# Patient Record
Sex: MALE | Race: OTHER | HISPANIC OR LATINO | Employment: FULL TIME | URBAN - METROPOLITAN AREA
[De-identification: names, ages, dates, MRNs, and addresses within clinical notes are randomized per-mention and may not be internally consistent; named-entity substitution may affect disease eponyms.]

---

## 2018-04-03 ENCOUNTER — TRANSCRIBE ORDERS (OUTPATIENT)
Dept: ADMINISTRATIVE | Facility: HOSPITAL | Age: 73
End: 2018-04-03

## 2018-04-03 DIAGNOSIS — R52 PAIN: Primary | ICD-10-CM

## 2018-04-09 ENCOUNTER — HOSPITAL ENCOUNTER (OUTPATIENT)
Dept: RADIOLOGY | Facility: HOSPITAL | Age: 73
End: 2018-04-09
Payer: MEDICARE

## 2018-04-09 ENCOUNTER — HOSPITAL ENCOUNTER (OUTPATIENT)
Dept: RADIOLOGY | Facility: HOSPITAL | Age: 73
Discharge: HOME | End: 2018-04-09
Payer: MEDICARE

## 2018-04-09 DIAGNOSIS — R52 PAIN: ICD-10-CM

## 2018-04-09 PROCEDURE — 72156 MRI NECK SPINE W/O & W/DYE: CPT

## 2018-04-09 PROCEDURE — 72158 MRI LUMBAR SPINE W/O & W/DYE: CPT

## 2018-04-09 PROCEDURE — A9585 GADOBUTROL INJECTION: HCPCS | Performed by: DENTIST

## 2018-04-09 RX ADMIN — GADOBUTROL 10 ML: 604.72 INJECTION INTRAVENOUS at 16:06

## 2018-06-22 ENCOUNTER — HOSPITAL ENCOUNTER (EMERGENCY)
Facility: HOSPITAL | Age: 73
Discharge: LEFT BEFORE DISCHARGE | End: 2018-06-22
Attending: EMERGENCY MEDICINE
Payer: MEDICARE

## 2018-06-22 ENCOUNTER — APPOINTMENT (EMERGENCY)
Dept: CT IMAGING | Facility: HOSPITAL | Age: 73
End: 2018-06-22
Payer: MEDICARE

## 2018-06-22 VITALS
DIASTOLIC BLOOD PRESSURE: 58 MMHG | HEART RATE: 57 BPM | RESPIRATION RATE: 20 BRPM | HEIGHT: 72 IN | BODY MASS INDEX: 25.56 KG/M2 | WEIGHT: 188.71 LBS | SYSTOLIC BLOOD PRESSURE: 171 MMHG | OXYGEN SATURATION: 98 % | TEMPERATURE: 97.9 F

## 2018-06-22 DIAGNOSIS — R29.898 TRANSIENT LEFT LEG WEAKNESS: ICD-10-CM

## 2018-06-22 DIAGNOSIS — G45.9 TIA (TRANSIENT ISCHEMIC ATTACK): Primary | ICD-10-CM

## 2018-06-22 LAB
ALBUMIN SERPL BCP-MCNC: 3.6 G/DL (ref 3.5–5)
ALP SERPL-CCNC: 67 U/L (ref 46–116)
ALT SERPL W P-5'-P-CCNC: 20 U/L (ref 12–78)
ANION GAP SERPL CALCULATED.3IONS-SCNC: 6 MMOL/L (ref 4–13)
AST SERPL W P-5'-P-CCNC: 23 U/L (ref 5–45)
ATRIAL RATE: 56 BPM
BASOPHILS # BLD AUTO: 0.03 THOUSANDS/ΜL (ref 0–0.1)
BASOPHILS NFR BLD AUTO: 1 % (ref 0–1)
BILIRUB DIRECT SERPL-MCNC: 0.14 MG/DL (ref 0–0.2)
BILIRUB SERPL-MCNC: 0.5 MG/DL (ref 0.2–1)
BUN SERPL-MCNC: 22 MG/DL (ref 5–25)
CALCIUM SERPL-MCNC: 8.7 MG/DL (ref 8.3–10.1)
CHLORIDE SERPL-SCNC: 108 MMOL/L (ref 100–108)
CO2 SERPL-SCNC: 28 MMOL/L (ref 21–32)
CREAT SERPL-MCNC: 0.92 MG/DL (ref 0.6–1.3)
EOSINOPHIL # BLD AUTO: 0.14 THOUSAND/ΜL (ref 0–0.61)
EOSINOPHIL NFR BLD AUTO: 3 % (ref 0–6)
ERYTHROCYTE [DISTWIDTH] IN BLOOD BY AUTOMATED COUNT: 13.1 % (ref 11.6–15.1)
GFR SERPL CREATININE-BSD FRML MDRD: 83 ML/MIN/1.73SQ M
GLUCOSE SERPL-MCNC: 97 MG/DL (ref 65–140)
HCT VFR BLD AUTO: 41.3 % (ref 36.5–49.3)
HGB BLD-MCNC: 13.9 G/DL (ref 12–17)
IMM GRANULOCYTES # BLD AUTO: 0.01 THOUSAND/UL (ref 0–0.2)
IMM GRANULOCYTES NFR BLD AUTO: 0 % (ref 0–2)
LYMPHOCYTES # BLD AUTO: 1.55 THOUSANDS/ΜL (ref 0.6–4.47)
LYMPHOCYTES NFR BLD AUTO: 32 % (ref 14–44)
MCH RBC QN AUTO: 32.2 PG (ref 26.8–34.3)
MCHC RBC AUTO-ENTMCNC: 33.7 G/DL (ref 31.4–37.4)
MCV RBC AUTO: 96 FL (ref 82–98)
MONOCYTES # BLD AUTO: 0.45 THOUSAND/ΜL (ref 0.17–1.22)
MONOCYTES NFR BLD AUTO: 9 % (ref 4–12)
NEUTROPHILS # BLD AUTO: 2.72 THOUSANDS/ΜL (ref 1.85–7.62)
NEUTS SEG NFR BLD AUTO: 55 % (ref 43–75)
NRBC BLD AUTO-RTO: 0 /100 WBCS
P AXIS: 8 DEGREES
PLATELET # BLD AUTO: 162 THOUSANDS/UL (ref 149–390)
PMV BLD AUTO: 10.5 FL (ref 8.9–12.7)
POTASSIUM SERPL-SCNC: 3.6 MMOL/L (ref 3.5–5.3)
PR INTERVAL: 222 MS
PROT SERPL-MCNC: 6.9 G/DL (ref 6.4–8.2)
QRS AXIS: -20 DEGREES
QRSD INTERVAL: 124 MS
QT INTERVAL: 446 MS
QTC INTERVAL: 430 MS
RBC # BLD AUTO: 4.32 MILLION/UL (ref 3.88–5.62)
SODIUM SERPL-SCNC: 142 MMOL/L (ref 136–145)
T WAVE AXIS: 24 DEGREES
TROPONIN I SERPL-MCNC: <0.02 NG/ML
TSH SERPL DL<=0.05 MIU/L-ACNC: 2.15 UIU/ML (ref 0.36–3.74)
VENTRICULAR RATE: 56 BPM
WBC # BLD AUTO: 4.9 THOUSAND/UL (ref 4.31–10.16)

## 2018-06-22 PROCEDURE — 84484 ASSAY OF TROPONIN QUANT: CPT | Performed by: EMERGENCY MEDICINE

## 2018-06-22 PROCEDURE — 99285 EMERGENCY DEPT VISIT HI MDM: CPT

## 2018-06-22 PROCEDURE — 70496 CT ANGIOGRAPHY HEAD: CPT

## 2018-06-22 PROCEDURE — 80076 HEPATIC FUNCTION PANEL: CPT | Performed by: EMERGENCY MEDICINE

## 2018-06-22 PROCEDURE — 93005 ELECTROCARDIOGRAM TRACING: CPT

## 2018-06-22 PROCEDURE — 93010 ELECTROCARDIOGRAM REPORT: CPT | Performed by: INTERNAL MEDICINE

## 2018-06-22 PROCEDURE — 85025 COMPLETE CBC W/AUTO DIFF WBC: CPT | Performed by: EMERGENCY MEDICINE

## 2018-06-22 PROCEDURE — 80048 BASIC METABOLIC PNL TOTAL CA: CPT | Performed by: EMERGENCY MEDICINE

## 2018-06-22 PROCEDURE — 70498 CT ANGIOGRAPHY NECK: CPT

## 2018-06-22 PROCEDURE — 84443 ASSAY THYROID STIM HORMONE: CPT | Performed by: EMERGENCY MEDICINE

## 2018-06-22 PROCEDURE — 36415 COLL VENOUS BLD VENIPUNCTURE: CPT | Performed by: EMERGENCY MEDICINE

## 2018-06-22 RX ORDER — ASPIRIN 325 MG
325 TABLET, DELAYED RELEASE (ENTERIC COATED) ORAL DAILY
Qty: 30 TABLET | Refills: 0 | Status: SHIPPED | OUTPATIENT
Start: 2018-06-22

## 2018-06-22 RX ADMIN — IOHEXOL 85 ML: 350 INJECTION, SOLUTION INTRAVENOUS at 13:23

## 2018-06-22 NOTE — ED NOTES
Pt calling [employer] for ride home  RTER instructions discussed  Ambulatory off unit without assistance        Madeline Antoine, CHELSEA  06/22/18 1593

## 2018-06-22 NOTE — ED PROVIDER NOTES
History  Chief Complaint   Patient presents with    Syncope     Pt presents from Urgent Care and had a near syncopal episode  States worked all night and has not been drinking fluids  Denies complaints at this time  HPI    None       History reviewed  No pertinent past medical history  Past Surgical History:   Procedure Laterality Date    HERNIA REPAIR         History reviewed  No pertinent family history  I have reviewed and agree with the history as documented  Social History   Substance Use Topics    Smoking status: Former Smoker    Smokeless tobacco: Not on file    Alcohol use No        Review of Systems    Physical Exam  Physical Exam   Constitutional: He is oriented to person, place, and time  He appears well-developed and well-nourished  No distress  HENT:   Head: Normocephalic and atraumatic  Eyes: Conjunctivae and EOM are normal  Pupils are equal, round, and reactive to light  Neck: Normal range of motion  No tracheal deviation present  Cardiovascular: Normal rate, regular rhythm, normal heart sounds and intact distal pulses  Pulmonary/Chest: Effort normal and breath sounds normal  No respiratory distress  Abdominal: Soft  He exhibits no distension  There is no tenderness  Neurological: He is alert and oriented to person, place, and time  He has normal strength  No cranial nerve deficit or sensory deficit  He displays a negative Romberg sign  Coordination and gait normal  GCS eye subscore is 4  GCS verbal subscore is 5  GCS motor subscore is 6  Reflex Scores:       Bicep reflexes are 1+ on the right side and 1+ on the left side  Brachioradialis reflexes are 1+ on the right side and 1+ on the left side  Patellar reflexes are 1+ on the right side and 1+ on the left side  Skin: Skin is warm and dry  Psychiatric: He has a normal mood and affect  His behavior is normal    Nursing note and vitals reviewed        Vital Signs  ED Triage Vitals   Temperature Pulse Respirations Blood Pressure SpO2   06/22/18 1154 06/22/18 1154 06/22/18 1154 06/22/18 1154 06/22/18 1154   97 9 °F (36 6 °C) 55 18 (!) 172/89 99 %      Temp Source Heart Rate Source Patient Position - Orthostatic VS BP Location FiO2 (%)   06/22/18 1154 06/22/18 1154 06/22/18 1154 06/22/18 1154 --   Oral Monitor Sitting Right arm       Pain Score       06/22/18 1430       No Pain           Vitals:    06/22/18 1236 06/22/18 1237 06/22/18 1238 06/22/18 1430   BP: (!) 172/89 166/86 165/84 (!) 171/58   Pulse: 56 (!) 53 55 57   Patient Position - Orthostatic VS: Lying - Orthostatic VS Sitting - Orthostatic VS Standing - Orthostatic VS        Visual Acuity  Visual Acuity      Most Recent Value   L Pupil Size (mm)  3   R Pupil Size (mm)  3          ED Medications  Medications   iohexol (OMNIPAQUE) 350 MG/ML injection (MULTI-DOSE) 85 mL (85 mL Intravenous Given 6/22/18 1323)       Diagnostic Studies  Results Reviewed     Procedure Component Value Units Date/Time    Hepatic function panel [[id number]]  (Normal) Collected:  06/22/18 1232    Lab Status:  Final result Specimen:  Blood from Arm, Right Updated:  06/22/18 1316     Total Bilirubin 0 50 mg/dL      Bilirubin, Direct 0 14 mg/dL      Alkaline Phosphatase 67 U/L      AST 23 U/L      ALT 20 U/L      Total Protein 6 9 g/dL      Albumin 3 6 g/dL     TSH [[id number]]  (Normal) Collected:  06/22/18 1232    Lab Status:  Final result Specimen:  Blood from Arm, Right Updated:  06/22/18 1316     TSH 3RD GENERATON 2 146 uIU/mL     Narrative:         Patients undergoing fluorescein dye angiography may retain small amounts of fluorescein in the body for 48-72 hours post procedure  Samples containing fluorescein can produce falsely depressed TSH values  If the patient had this procedure,a specimen should be resubmitted post fluorescein clearance      Troponin I [[id number]]  (Normal) Collected:  06/22/18 1232    Lab Status:  Final result Specimen:  Blood from Arm, Right Updated:  06/22/18 1308     Troponin I <0 02 ng/mL     Basic metabolic panel [[id number]] Collected:  06/22/18 1232    Lab Status:  Final result Specimen:  Blood from Arm, Right Updated:  06/22/18 1303     Sodium 142 mmol/L      Potassium 3 6 mmol/L      Chloride 108 mmol/L      CO2 28 mmol/L      Anion Gap 6 mmol/L      BUN 22 mg/dL      Creatinine 0 92 mg/dL      Glucose 97 mg/dL      Calcium 8 7 mg/dL      eGFR 83 ml/min/1 73sq m     Narrative:         National Kidney Disease Education Program recommendations are as follows:  GFR calculation is accurate only with a steady state creatinine  Chronic Kidney disease less than 60 ml/min/1 73 sq  meters  Kidney failure less than 15 ml/min/1 73 sq  meters  CBC and differential [[id number]] Collected:  06/22/18 1232    Lab Status:  Final result Specimen:  Blood from Arm, Right Updated:  06/22/18 1246     WBC 4 90 Thousand/uL      RBC 4 32 Million/uL      Hemoglobin 13 9 g/dL      Hematocrit 41 3 %      MCV 96 fL      MCH 32 2 pg      MCHC 33 7 g/dL      RDW 13 1 %      MPV 10 5 fL      Platelets 899 Thousands/uL      nRBC 0 /100 WBCs      Neutrophils Relative 55 %      Immat GRANS % 0 %      Lymphocytes Relative 32 %      Monocytes Relative 9 %      Eosinophils Relative 3 %      Basophils Relative 1 %      Neutrophils Absolute 2 72 Thousands/µL      Immature Grans Absolute 0 01 Thousand/uL      Lymphocytes Absolute 1 55 Thousands/µL      Monocytes Absolute 0 45 Thousand/µL      Eosinophils Absolute 0 14 Thousand/µL      Basophils Absolute 0 03 Thousands/µL                  CTA head and neck with and without contrast   Final Result by Jerad Steinberg MD (06/22 7443)      No acute intracranial abnormality  No hemodynamically significant stenosis within either common or internal carotid artery  Less than 50% stenosis by NASCET criteria  No focal stenosis or saccular aneurysm within the [place]                 Workstation performed: [id number] Procedures  ECG 12 Lead Documentation  Date/Time: 6/22/2018 12:27 PM  Performed by: Toya De La Fuente  Authorized by: Toya De La Fuente     Indications / Diagnosis:  TIA  ECG reviewed by me, the ED Provider: yes    Patient location:  ED  Previous ECG:     Previous ECG:  Unavailable  Interpretation:     Interpretation: abnormal    Rate:     ECG rate:  56    ECG rate assessment: normal    Rhythm:     Rhythm: sinus bradycardia and A-V block    Ectopy:     Ectopy: none    QRS:     QRS axis:  Left    QRS intervals: Wide  Conduction:     Conduction: abnormal      Abnormal conduction: 1st degree    ST segments:     ST segments:  Normal  T waves:     T waves: non-specific and inverted      Inverted:  III  Other findings:     Other findings: LVH             Phone Contacts  ED Phone Contact    ED Course                   Stroke Assessment     Row Name 06/22/18 1228             NIH Stroke Scale    Interval Baseline      Level of Consciousness (1a ) 0      LOC Questions (1b ) 0      LOC Commands (1c ) 0      Best Gaze (2 ) 0      Visual (3 ) 0      Facial Palsy (4 ) 0      Motor Arm, Left (5a ) 0      Motor Arm, Right (5b ) 0      Motor Leg, Left (6a ) 0      Motor Leg, Right (6b ) 0      Limb Ataxia (7 ) 0      Sensory (8 ) 0      Best Language (9 ) 0      Dysarthria (10 ) 0      Extinction and Inattention (11 ) (Formerly Neglect) 0      Total 0                First Filed Value   TPA Decision  Patient not a TPA candidate  Patient is not a candidate options  Symptoms resolved/clearly non disabling  MDM  Number of Diagnoses or Management Options  TIA (transient ischemic attack): new and requires workup  Transient left leg weakness: new and requires workup  Diagnosis management comments: This is a 72-year-old male who presents here today with left leg weakness  He states he worked all night, and went to physical therapy for his neck    He states they were doing exercises with his legs, and about 0900 he went to stand up when he "lost his balance" which he describes as feeling like his left leg was not working correctly  He denies any associated lightheadedness, vertiginous symptoms, syncope or near syncope  He states he sat down this resolved after a couple of minutes  He stopped therapy early and drove home  He walked into the house, and as he was going through the door felt like he "lost his balance" again  He states it was just his left leg that was giving him issues, and again felt like it was not working appropriately  This resolved spontaneously after several minutes  He then took his wife to urgent care, so that she could be seen for URI symptoms  While he was walking in with her he again felt like he lost his balance, with both his left arm and leg seeming like they were not working appropriately  He denies any numbness  He denies any problems with his lower back or his leg before  He denies any vertiginous symptoms, syncope, near-syncope, chest pain, trouble breathing, palpitations, recent the injuries or infectious symptoms  He states he feels just fine at this time, and is only here because they insisted that he be seen, even though he did not think it was necessary  He denies any vision changes, tinnitus, difficulty speaking or swallowing  He denies any facial droop  He denies any known underlying medical problems  He attributes his symptoms to dehydration from working all night and not drinking  All of his episodes lasted for couple of minutes, and resolved spontaneously  He denies any prior similar symptoms  ROS: Otherwise negative, unless stated as above  He is well-appearing, in no acute distress  His exam is unremarkable, currently without any focal neurologic deficits  Concern is greatest for progressive TIAs, given as the first 2 times was just his leg, and the third involved his arm    This could be triggered by mild dehydration in low flow state when he is first standing up with partial occlusion of a vessel, however it is concerning that the symptoms are worsening with time  We will check CT/CTA per stroke protocol, as well as lab work and EKG to evaluate  We will check orthostatics as his symptoms were triggered by rapid standing  As his NIH stroke scale is currently 0, he is not a candidate for tPA at this time  The patient has had no recurrence of symptoms while in the emergency department, and was able to walk with a steady gait to the bathroom  His lab work and CT scan are unremarkable  The patient was advised that given the concern for TIAs, he should be admitted for further workup and evaluation of his symptoms  However the patient declines this  I discussed with him the concern for progressive TIAs, possibility of this preceding ischemic CVA, and risk of permanent neurologic deficit  The patient declines admission to the hospital, despite these risks  He is able to state that he understands the risk of permanent disability and weakness from missing a stroke  I discussed with him treatment at home, need for follow-up with his primary care doctor and a neurologist, and indications for return, and the patient and his wife expressed understanding with this plan           Amount and/or Complexity of Data Reviewed  Clinical lab tests: reviewed and ordered  Tests in the radiology section of CPT®: ordered and reviewed  Independent visualization of images, tracings, or specimens: yes      CritCare Time    Disposition  Final diagnoses:   TIA (transient ischemic attack)   Transient left leg weakness     Time reflects when diagnosis was documented in both MDM as applicable and the Disposition within this note     Time User Action Codes Description Comment    6/22/2018  2:26 PM [facility] Add [G45 9] TIA (transient ischemic attack)     6/22/2018  2:27 PM [facility] Add [R29 898] Transient left leg weakness       ED Disposition     ED Disposition Condition Comment    AMA  Date: 6/22/2018  Patient: Albert Pena  Admitted: 6/22/2018 11:51 AM  Attending Provider: Matilda Pond    Albert Pena or his authorized caregiver has made the decision for the patient to leave the emergency department against the advice o f his attending physician  He or his authorized caregiver has been informed and understands the inherent risks, including death, stroke, permanent weakness  He or his authorized caregiver has decided to accept the responsibility for this decision  Albert Pena and all necessary parties have been advised that he may return for further evaluation or treatment  His condition at time of discharge was stable  Albert Pena had current vital signs as follows:  /84   Pulse 55   Temp 97 9 °F (36 6 °C) (O ral)   Resp 18   Ht 6' (1 829 m)   Wt 85 6 kg (188 lb 11 4 oz)         Follow-up Information     Follow up With Specialties Details Why Contact Info Additional [address] Internal Medicine Schedule an appointment as soon as possible for a visit in 3 days to follow up on your symptoms [address] [facility]       Jamal Kent MD Neurology Schedule an appointment as soon as possible for a visit to follow up on your symptoms 3 [address] Emergency Department Emergency Medicine Go to immediately, for recurrent or worsening symptoms Kahlil Vanegas  [facility] [address], [address], [place], [address]          Discharge Medication List as of 6/22/2018  2:29 PM      START taking these medications    Details   aspirin (ECOTRIN) 325 mg EC tablet Take 1 tablet (325 mg total) by mouth daily, Starting Fri 6/22/2018, Print           No discharge procedures on file      ED Provider  Electronically Signed by           Marissa Funez MD  06/23/18 7517

## 2018-06-22 NOTE — DISCHARGE INSTRUCTIONS
Start taking the aspirin daily  Follow up closely with her primary care doctor and a neurologist for further evaluation of your symptoms  Return for recurrent symptoms, particularly if they do not resolve after a couple of minutes, or for any other concerns  Call 911 to take you to the hospital, and do not drive yourself  Transient Ischemic Attack, Ambulatory Care   GENERAL INFORMATION:   A transient ischemic attack  (TIA) happens when blood cannot flow to part of your brain  A TIA lasts a short time, and the effects are gone in less than 24 hours  A TIA does not cause lasting damage, but it may be a warning that you are about to have an ischemic stroke  An ischemic stroke happens when blood flow to the brain is suddenly blocked, usually by a blood clot  Common symptoms include the following:   · Numbness, tingling, weakness, or paralysis     · Trouble walking, swallowing, talking, or understanding language     · Blurry or double vision  How can I tell if someone is having a stroke? Know the F A S T  test to recognize the signs of a stroke:  · F = Face:  Ask the person to smile  Drooping on 1 side of the mouth or face is a sign of a stroke  · A = Arms:  Ask the person to raise both arms  One arm that slowly comes back down or cannot be raised is a sign of a stroke  · S = Speech:  Ask the person to repeat a simple sentence that you say first  Speech that is slurred or sounds strange is a sign of a stroke  · T = Time:  Call 911 if you see any of these signs  This is an emergency  Seek immediate care for the following symptoms:   · Double vision or vision loss    · Confusion and problems speaking or understanding speech    · Weakness or numbness in your arm, leg, or face    · Severe headache or feeling dizzy    · Bleeding from your rectum or nose  Treatment for a TIA  may include any of the following:  · Antiplatelets , such as aspirin, help prevent blood clots   Take your antiplatelet medicine exactly as directed  These medicines make it more likely for you to bleed or bruise  If you are told to take aspirin, do not take acetaminophen or ibuprofen instead  · Anticoagulants    are a type of blood thinner medicine that helps prevent clots  Clots can cause strokes, heart attacks, and death  These medicines may cause you to bleed or bruise more easily  ¨ Watch for bleeding from your gums or nose  Watch for blood in your urine and bowel movements  Use a soft washcloth and a soft toothbrush  If you shave, use an electric razor  Avoid activities that can cause bruising or bleeding  ¨ Tell your healthcare provider about all medicines you take because many medicines cannot be used with anticoagulants  Do not start or stop any medicines unless your healthcare provider tells you to  Tell your dentist and other healthcare providers that you take anticoagulants  Wear a bracelet or necklace that says you take this medicine  ¨ You will need regular blood tests so your healthcare provider can decide how much medicine you need  Take anticoagulants exactly as directed  Tell your healthcare provider right away if you forget to take the medicine, or if you take too much  ¨ If you take warfarin, some foods can change how your blood clots  Do not make major changes to your diet while you take warfarin  Warfarin works best when you eat about the same amount of vitamin K every day  Vitamin K is found in green leafy vegetables, broccoli, grapes, and other foods  Ask for more information about what to eat when you take warfarin  · Thrombolytics  help break apart blood clots  · Surgery  may be needed to open a blocked artery  Manage and prevent TIA:   · Manage health conditions  High blood pressure, diabetes, and high cholesterol all increase your risk of a TIA or stroke  Take your medicine as directed  Follow your healthcare provider's instructions to check your blood pressure and blood sugar levels  Write the numbers down to show him  · Eat a variety of healthy foods  Healthy foods include fruits, vegetables, whole-grain breads, low-fat dairy products, beans, lean meats, and fish  Ask if you need to be on a special diet  · Maintain a healthy weight  Ask your healthcare provider how much you should weigh  Ask him to help you create a weight loss plan if you are overweight  Weight loss can decrease your blood pressure and your risk of stroke  · Limit alcohol  Men should limit alcohol to 2 drinks per day  Women should limit alcohol to 1 drink per day  A drink of alcohol is 12 ounces of beer, 5 ounces of wine, or 1½ ounces of liquor  · Do not use street drugs or smoke cigarettes  Your risk of stroke increases if you use drugs such as cocaine, or you smoke cigarettes  Ask your healthcare provider for information if you are having trouble quitting  Follow up with your healthcare provider as directed:  Write down your questions so you remember to ask them during your visits  CARE AGREEMENT:   You have the right to help plan your care  Learn about your health condition and how it may be treated  Discuss treatment options with your caregivers to decide what care you want to receive  You always have the right to refuse treatment  The above information is an  only  It is not intended as medical advice for individual conditions or treatments  Talk to your doctor, nurse or pharmacist before following any medical regimen to see if it is safe and effective for you  © 2014 [address] is for End User's use only and may not be sold, redistributed or otherwise used for commercial purposes  All illustrations and images included in CareNotes® are the copyrighted property of [employer]  or Julien Bowden

## 2018-07-13 ENCOUNTER — TRANSCRIBE ORDERS (OUTPATIENT)
Dept: ADMINISTRATIVE | Facility: HOSPITAL | Age: 73
End: 2018-07-13

## 2018-07-13 DIAGNOSIS — I63.9 CEREBROVASCULAR ACCIDENT (CVA), UNSPECIFIED MECHANISM (HCC): Primary | ICD-10-CM

## 2018-07-24 ENCOUNTER — HOSPITAL ENCOUNTER (OUTPATIENT)
Dept: RADIOLOGY | Facility: HOSPITAL | Age: 73
Discharge: HOME | End: 2018-07-24
Payer: MEDICARE

## 2018-07-24 DIAGNOSIS — I63.9 CEREBROVASCULAR ACCIDENT (CVA), UNSPECIFIED MECHANISM (HCC): ICD-10-CM

## 2018-07-24 PROCEDURE — 70553 MRI BRAIN STEM W/O & W/DYE: CPT

## 2018-07-24 PROCEDURE — A9585 GADOBUTROL INJECTION: HCPCS | Performed by: DENTIST

## 2018-07-24 RX ADMIN — GADOBUTROL 8 ML: 604.72 INJECTION INTRAVENOUS at 16:00

## 2018-12-05 ENCOUNTER — HOSPITAL ENCOUNTER (EMERGENCY)
Facility: HOSPITAL | Age: 73
Discharge: ANOTHER HOSPITAL | End: 2018-12-05
Attending: EMERGENCY MEDICINE | Admitting: EMERGENCY MEDICINE
Payer: OTHER MISCELLANEOUS

## 2018-12-05 VITALS
DIASTOLIC BLOOD PRESSURE: 88 MMHG | OXYGEN SATURATION: 96 % | RESPIRATION RATE: 16 BRPM | BODY MASS INDEX: 25.59 KG/M2 | SYSTOLIC BLOOD PRESSURE: 176 MMHG | TEMPERATURE: 97.4 F | WEIGHT: 188.71 LBS | HEART RATE: 58 BPM

## 2018-12-05 DIAGNOSIS — T14.8XXA ABRASION: ICD-10-CM

## 2018-12-05 DIAGNOSIS — S05.32XA RUPTURE OF GLOBE OF EYE FOLLOWING BLUNT TRAUMA, LEFT, INITIAL ENCOUNTER: Primary | ICD-10-CM

## 2018-12-05 LAB
ANION GAP SERPL CALCULATED.3IONS-SCNC: 9 MMOL/L (ref 4–13)
APTT PPP: 30 SECONDS (ref 26–38)
BASOPHILS # BLD AUTO: 0.03 THOUSANDS/ΜL (ref 0–0.1)
BASOPHILS NFR BLD AUTO: 1 % (ref 0–1)
BUN SERPL-MCNC: 23 MG/DL (ref 5–25)
CALCIUM SERPL-MCNC: 9.2 MG/DL (ref 8.3–10.1)
CHLORIDE SERPL-SCNC: 107 MMOL/L (ref 100–108)
CO2 SERPL-SCNC: 28 MMOL/L (ref 21–32)
CREAT SERPL-MCNC: 0.94 MG/DL (ref 0.6–1.3)
EOSINOPHIL # BLD AUTO: 0.18 THOUSAND/ΜL (ref 0–0.61)
EOSINOPHIL NFR BLD AUTO: 3 % (ref 0–6)
ERYTHROCYTE [DISTWIDTH] IN BLOOD BY AUTOMATED COUNT: 13.2 % (ref 11.6–15.1)
GFR SERPL CREATININE-BSD FRML MDRD: 80 ML/MIN/1.73SQ M
GLUCOSE SERPL-MCNC: 101 MG/DL (ref 65–140)
HCT VFR BLD AUTO: 44.9 % (ref 36.5–49.3)
HGB BLD-MCNC: 15.3 G/DL (ref 12–17)
IMM GRANULOCYTES # BLD AUTO: 0.01 THOUSAND/UL (ref 0–0.2)
IMM GRANULOCYTES NFR BLD AUTO: 0 % (ref 0–2)
INR PPP: 1.09 (ref 0.86–1.17)
LYMPHOCYTES # BLD AUTO: 1.85 THOUSANDS/ΜL (ref 0.6–4.47)
LYMPHOCYTES NFR BLD AUTO: 31 % (ref 14–44)
MCH RBC QN AUTO: 33 PG (ref 26.8–34.3)
MCHC RBC AUTO-ENTMCNC: 34.1 G/DL (ref 31.4–37.4)
MCV RBC AUTO: 97 FL (ref 82–98)
MONOCYTES # BLD AUTO: 0.51 THOUSAND/ΜL (ref 0.17–1.22)
MONOCYTES NFR BLD AUTO: 9 % (ref 4–12)
NEUTROPHILS # BLD AUTO: 3.42 THOUSANDS/ΜL (ref 1.85–7.62)
NEUTS SEG NFR BLD AUTO: 56 % (ref 43–75)
NRBC BLD AUTO-RTO: 0 /100 WBCS
PLATELET # BLD AUTO: 220 THOUSANDS/UL (ref 149–390)
PMV BLD AUTO: 11 FL (ref 8.9–12.7)
POTASSIUM SERPL-SCNC: 3.9 MMOL/L (ref 3.5–5.3)
PROTHROMBIN TIME: 14 SECONDS (ref 11.8–14.2)
RBC # BLD AUTO: 4.64 MILLION/UL (ref 3.88–5.62)
SODIUM SERPL-SCNC: 144 MMOL/L (ref 136–145)
WBC # BLD AUTO: 6 THOUSAND/UL (ref 4.31–10.16)

## 2018-12-05 PROCEDURE — 36415 COLL VENOUS BLD VENIPUNCTURE: CPT | Performed by: PHYSICIAN ASSISTANT

## 2018-12-05 PROCEDURE — 96365 THER/PROPH/DIAG IV INF INIT: CPT

## 2018-12-05 PROCEDURE — 85025 COMPLETE CBC W/AUTO DIFF WBC: CPT | Performed by: PHYSICIAN ASSISTANT

## 2018-12-05 PROCEDURE — 90715 TDAP VACCINE 7 YRS/> IM: CPT | Performed by: PHYSICIAN ASSISTANT

## 2018-12-05 PROCEDURE — 99284 EMERGENCY DEPT VISIT MOD MDM: CPT

## 2018-12-05 PROCEDURE — 80048 BASIC METABOLIC PNL TOTAL CA: CPT | Performed by: PHYSICIAN ASSISTANT

## 2018-12-05 PROCEDURE — 96375 TX/PRO/DX INJ NEW DRUG ADDON: CPT

## 2018-12-05 PROCEDURE — 85610 PROTHROMBIN TIME: CPT | Performed by: PHYSICIAN ASSISTANT

## 2018-12-05 PROCEDURE — 85730 THROMBOPLASTIN TIME PARTIAL: CPT | Performed by: PHYSICIAN ASSISTANT

## 2018-12-05 PROCEDURE — 90471 IMMUNIZATION ADMIN: CPT

## 2018-12-05 RX ORDER — TETRACAINE HYDROCHLORIDE 5 MG/ML
1 SOLUTION OPHTHALMIC ONCE
Status: COMPLETED | OUTPATIENT
Start: 2018-12-05 | End: 2018-12-05

## 2018-12-05 RX ORDER — LEVOFLOXACIN 5 MG/ML
750 INJECTION, SOLUTION INTRAVENOUS ONCE
Status: DISCONTINUED | OUTPATIENT
Start: 2018-12-05 | End: 2018-12-05

## 2018-12-05 RX ORDER — ONDANSETRON 2 MG/ML
4 INJECTION INTRAMUSCULAR; INTRAVENOUS ONCE
Status: COMPLETED | OUTPATIENT
Start: 2018-12-05 | End: 2018-12-05

## 2018-12-05 RX ORDER — CEFAZOLIN SODIUM 2 G/50ML
2000 SOLUTION INTRAVENOUS ONCE
Status: COMPLETED | OUTPATIENT
Start: 2018-12-05 | End: 2018-12-05

## 2018-12-05 RX ADMIN — FLUORESCEIN SODIUM 1 STRIP: 1 STRIP OPHTHALMIC at 04:38

## 2018-12-05 RX ADMIN — ONDANSETRON 4 MG: 2 INJECTION INTRAMUSCULAR; INTRAVENOUS at 05:58

## 2018-12-05 RX ADMIN — TETANUS TOXOID, REDUCED DIPHTHERIA TOXOID AND ACELLULAR PERTUSSIS VACCINE, ADSORBED 0.5 ML: 5; 2.5; 8; 8; 2.5 SUSPENSION INTRAMUSCULAR at 05:01

## 2018-12-05 RX ADMIN — TETRACAINE HYDROCHLORIDE 1 DROP: 5 SOLUTION OPHTHALMIC at 04:38

## 2018-12-05 RX ADMIN — CEFAZOLIN SODIUM 2000 MG: 2 SOLUTION INTRAVENOUS at 06:02

## 2018-12-05 RX ADMIN — MORPHINE SULFATE 2 MG: 2 INJECTION, SOLUTION INTRAMUSCULAR; INTRAVENOUS at 05:57

## 2018-12-05 RX ADMIN — LEVOFLOXACIN 750 MG: 250 TABLET, FILM COATED ORAL at 07:09

## 2018-12-05 NOTE — ED NOTES
Report called to Claudette Pares at [facility] at this time  Claudette Pares requested call when patient leaves facility   Phone number [phone]     Anastasiya Welch RN  12/05/18 0113

## 2018-12-05 NOTE — ED NOTES
Please call nurse report to the following number for transport:    [phone]     Jenn Glaser  12/05/18 9561

## 2018-12-05 NOTE — EMTALA/ACUTE CARE TRANSFER
[address] [place] [address]  Dept: [phone]      AJUDZZ TRANSFER CONSENT    NAME Petra GUERRA 1945                              MRN [id number]    I have been informed of my rights regarding examination, treatment, and transfer   by Dr Tacho Adams MD    Benefits: Specialized equipment and/or services available at the receiving facility (Include comment)________________________    Risks: Potential for delay in receiving treatment      Consent for Transfer:  I acknowledge that my medical condition has been evaluated and explained to me by the emergency department physician or other qualified medical person and/or my attending physician, who has recommended that I be transferred to the service of  Accepting Physician: Dr Denis Ruiz at [facility] Name, [facility] ED [facility] : [place]  The above potential benefits of such transfer, the potential risks associated with such transfer, and the probable risks of not being transferred have been explained to me, and I fully understand them  The doctor has explained that, in my case, the benefits of transfer outweigh the risks  I agree to be transferred  I authorize the performance of emergency medical procedures and treatments upon me in both transit and upon arrival at the receiving facility  Additionally, I authorize the release of any and all medical records to the receiving facility and request they be transported with me, if possible  I understand that the safest mode of transportation during a medical emergency is an ambulance and that the Hospital advocates the use of this mode of transport  Risks of traveling to the receiving facility by car, including absence of medical control, life sustaining equipment, such as oxygen, and medical personnel has been explained to me and I fully understand them      (KATHY CORRECT BOX BELOW)  [  ]  I consent to the stated transfer and to be transported by ambulance/helicopter  [  ]  I consent to the stated transfer, but refuse transportation by ambulance and accept full responsibility for my transportation by car  I understand the risks of non-ambulance transfers and I exonerate the Hospital and its staff from any deterioration in my condition that results from this refusal     X___________________________________________    DATE  18  TIME________  Signature of patient or legally responsible individual signing on patient behalf           RELATIONSHIP TO PATIENT_________________________          Provider Certification    NAME Mckayla Lanza                                         1945                              MRN [id number]    A medical screening exam was performed on the above named patient  Based on the examination:    Condition Necessitating Transfer The primary encounter diagnosis was Rupture of globe of eye following blunt trauma, left, initial encounter  A diagnosis of Abrasion was also pertinent to this visit  Patient Condition: The patient has been stabilized such that within reasonable medical probability, no material deterioration of the patient condition or the condition of the unborn child(benson) is likely to result from the transfer    Reason for Transfer: Level of Care needed not available at this facility    Transfer Requirements: [address]   · Space available and qualified personnel available for treatment as acknowledged by    · Agreed to accept transfer and to provide appropriate medical treatment as acknowledged by       Dr Tristen Segura  · Appropriate medical records of the examination and treatment of the patient are provided at the time of transfer   [address], [address] _______  · Transfer will be performed by qualified personnel from    and appropriate transfer equipment as required, including the use of necessary and appropriate life support measures      Provider Certification: I have examined the patient and explained the following risks and benefits of being transferred/refusing transfer to the patient/family:  General risk, such as traffic hazards, adverse weather conditions, rough terrain or turbulence, possible failure of equipment (including vehicle or aircraft), or consequences of actions of persons outside the control of the transport personnel      Based on these reasonable risks and benefits to the patient and/or the unborn child(benson), and based upon the information available at the time of the patients examination, I certify that the medical benefits reasonably to be expected from the provision of appropriate medical treatments at another medical facility outweigh the increasing risks, if any, to the individuals medical condition, and in the case of labor to the unborn child, from effecting the transfer      X____________________________________________ DATE 12/05/18        TIME_______      ORIGINAL - SEND TO MEDICAL RECORDS   COPY - SEND WITH PATIENT DURING TRANSFER

## 2018-12-05 NOTE — ED PROVIDER NOTES
History  Chief Complaint   Patient presents with    Eye Laceration     states "a department sign hit left eye while at work tonight   vision is blurry" teatnus not up to date     Patient is a 79-year-old male that presents to the emergency department with complaints of left eye injury had occurred 1 hour prior to arrival   Patient states he was at work lifting a box up on a shelf when a a department store sign fell and hit him directly on his left eye  Patient states he had immediate pain and blurred vision  Patient denies loss of consciousness, headache, dizziness, weakness  Tetanus is not up-to-date  Patient has no known medical problems  Prior to Admission Medications   Prescriptions Last Dose Informant Patient Reported? Taking?   aspirin (ECOTRIN) 325 mg EC tablet   No No   Sig: Take 1 tablet (325 mg total) by mouth daily      Facility-Administered Medications: None       History reviewed  No pertinent past medical history  Past Surgical History:   Procedure Laterality Date    HERNIA REPAIR         History reviewed  No pertinent family history  I have reviewed and agree with the history as documented  Social History   Substance Use Topics    Smoking status: Former Smoker    Smokeless tobacco: Never Used    Alcohol use No        Review of Systems   Constitutional: Negative for fever  Eyes: Positive for pain and visual disturbance  Respiratory: Negative for shortness of breath  Cardiovascular: Negative for chest pain  All other systems reviewed and are negative  Physical Exam  Physical Exam   Constitutional: He is oriented to person, place, and time  He appears well-developed and well-nourished  HENT:   Head: Normocephalic  Eyes: Pupils are equal, round, and reactive to light  EOM are normal  Left conjunctiva has a hemorrhage  Slit lamp exam:       The left eye shows corneal abrasion  The left eye shows no hyphema         Vision exam with glasses  20/25 R  20/100 L Neck: Normal range of motion  Cardiovascular: Normal rate, regular rhythm, normal heart sounds and intact distal pulses  Pulmonary/Chest: Effort normal and breath sounds normal    Abdominal: Soft  Bowel sounds are normal    Musculoskeletal: Normal range of motion  Neurological: He is alert and oriented to person, place, and time  He has normal strength  No cranial nerve deficit or sensory deficit  Skin: Skin is warm  Psychiatric: He has a normal mood and affect  His behavior is normal  Judgment and thought content normal    Vitals reviewed  Vital Signs  ED Triage Vitals [12/05/18 0349]   Temperature Pulse Respirations Blood Pressure SpO2   (!) 97 4 °F (36 3 °C) 69 19 (!) 178/89 97 %      Temp Source Heart Rate Source Patient Position - Orthostatic VS BP Location FiO2 (%)   Oral Monitor Sitting Right arm --      Pain Score       7           Vitals:    12/05/18 0349   BP: (!) 178/89   Pulse: 69   Patient Position - Orthostatic VS: Sitting       Visual Acuity      ED Medications  Medications   levofloxacin (LEVAQUIN) IVPB (premix) 750 mg (not administered)   ceFAZolin (ANCEF) IVPB (premix) 2,000 mg (2,000 mg Intravenous New Bag 12/5/18 0602)   morphine injection 2 mg (not administered)   fluorescein sodium sterile ophthalmic strip 1 strip (1 strip Left Eye Given by Other 12/5/18 7828)   tetracaine 0 5 % ophthalmic solution 1 drop (1 drop Left Eye Given by Other 12/5/18 0438)   tetanus-diphtheria-acellular pertussis (BOOSTRIX) IM injection 0 5 mL (0 5 mL Intramuscular Given 12/5/18 0501)   morphine injection 2 mg (2 mg Intravenous Given 12/5/18 3657)   ondansetron (ZOFRAN) injection 4 mg (4 mg Intravenous Given 12/5/18 0558)       Diagnostic Studies  Results Reviewed     Procedure Component Value Units Date/Time    APTT [[id number]] Collected:  12/05/18 0603    Lab Status:   In process Specimen:  Blood from Arm, Right Updated:  12/05/18 Radha Allred [[id number]] Collected:  12/05/18 0603    Lab Status: In process Specimen:  Blood from Arm, Right Updated:  12/05/18 0608    CBC and differential [[id number]] Collected:  12/05/18 0603    Lab Status: In process Specimen:  Blood from Arm, Right Updated:  12/05/18 2840    Basic metabolic panel [[id number]] Collected:  12/05/18 0603    Lab Status: In process Specimen:  Blood from Arm, Right Updated:  12/05/18 0608    Rapid drug screen, urine [[id number]]     Lab Status:  No result Specimen:  Urine                  No orders to display              Procedures  Procedures       Phone Contacts  ED Phone Contact    ED Course                               MDM  Number of Diagnoses or Management Options  Abrasion:   Rupture of globe of eye following blunt trauma, left, initial encounter:   Diagnosis management comments: Patient is a 79-year-old male presents to the emergency department after blunt trauma injury to his left eye  On examination of his left eye, there is positive Yuval test, causing concern for a probable globe injury  Visual acuity was performed and does show significant loss of visual acuity of the affected eye  Tetanus status was updated  Dr Ade Brown at [facility] was contacted and he recommended transfer to [facility] was contacted and patient was accepted in transfer to Dr Denis Ruiz  Risks benefits of the transfer was discussed  Patient agreeable to transfer         Amount and/or Complexity of Data Reviewed  Clinical lab tests: ordered and reviewed  Review and summarize past medical records: yes  Discuss the patient with other providers: yes    Risk of Complications, Morbidity, and/or Mortality  Presenting problems: moderate  Diagnostic procedures: moderate  Management options: moderate    Patient Progress  Patient progress: stable    CritCare Time    Disposition  Final diagnoses:   Rupture of globe of eye following blunt trauma, left, initial encounter   Abrasion     Time reflects when diagnosis was documented in both MDM as applicable and the Disposition within this note     Time User Action Codes Description Comment    12/5/2018  5:58 AM Adam Shock Add [[id number]] Rupture of globe of eye following blunt trauma, left, initial encounter     12/5/2018  5:58 AM Adam Shock Add [place] [facility]  [address] Abrasion       ED Disposition     ED Disposition Condition Comment    Transfer to Another [address] should be transferred out to [facility]       MD Documentation      Most Recent Value   Patient Condition  The patient has been stabilized such that within reasonable medical probability, no material deterioration of the patient condition or the condition of the unborn child(benson) is likely to result from the transfer   Reason for Transfer  Level of Care needed not available at this facility   Benefits of Transfer  Specialized equipment and/or services available at the receiving facility (Include comment)________________________   Risks of Transfer  Potential for delay in receiving treatment   Accepting Physician  Dr Jay Willoughby Name, Kendra Fang Notice   Provider Certification  General risk, such as traffic hazards, adverse weather conditions, rough terrain or turbulence, possible failure of equipment (including vehicle or aircraft), or consequences of actions of persons outside the control of the transport personnel      RN Documentation      Most [facility] Name, [address]    None         Patient's Medications   Discharge Prescriptions    No medications on file     No discharge procedures on file      ED Provider  Electronically Signed by           Sheila Reyes PA-C  12/05/18 3232

## 2020-04-29 ENCOUNTER — TELEPHONE (OUTPATIENT)
Dept: OBGYN CLINIC | Facility: HOSPITAL | Age: 75
End: 2020-04-29

## 2022-04-08 ENCOUNTER — OFFICE VISIT (OUTPATIENT)
Dept: PODIATRY | Facility: CLINIC | Age: 77
End: 2022-04-08
Payer: MEDICARE

## 2022-04-08 VITALS
BODY MASS INDEX: 26.28 KG/M2 | SYSTOLIC BLOOD PRESSURE: 132 MMHG | WEIGHT: 194 LBS | HEIGHT: 72 IN | HEART RATE: 66 BPM | OXYGEN SATURATION: 94 % | DIASTOLIC BLOOD PRESSURE: 66 MMHG

## 2022-04-08 DIAGNOSIS — L85.3 XEROSIS OF SKIN: ICD-10-CM

## 2022-04-08 DIAGNOSIS — B35.1 TINEA UNGUIUM: ICD-10-CM

## 2022-04-08 DIAGNOSIS — L84 CALLUS OF FOOT: Primary | ICD-10-CM

## 2022-04-08 DIAGNOSIS — M79.672 LEFT FOOT PAIN: ICD-10-CM

## 2022-04-08 PROCEDURE — 11055 PARING/CUTG B9 HYPRKER LES 1: CPT | Performed by: STUDENT IN AN ORGANIZED HEALTH CARE EDUCATION/TRAINING PROGRAM

## 2022-04-08 PROCEDURE — 99203 OFFICE O/P NEW LOW 30 MIN: CPT | Performed by: STUDENT IN AN ORGANIZED HEALTH CARE EDUCATION/TRAINING PROGRAM

## 2022-04-08 RX ORDER — UREA 40 %
CREAM (GRAM) TOPICAL DAILY
Qty: 85 G | Refills: 2 | Status: SHIPPED | OUTPATIENT
Start: 2022-04-08

## 2022-04-09 NOTE — PROGRESS NOTES
Assessment/Plan:    No problem-specific Assessment & Plan notes found for this encounter  Diagnoses and all orders for this visit:    Callus of foot  -     urea (CARMOL) 40 %; Apply topically daily    Tinea unguium  -     ciclopirox (PENLAC) 8 % solution; Apply topically daily at bedtime    Xerosis of skin  -     urea (CARMOL) 40 %; Apply topically daily    Left foot pain    Other orders  -     Lesion Destruction      Plan:    - diagnosis and treatments were discussed with patient  I offered patient various treatment options including topical OTC and prescription anti-fungal topicals and oral anti-fungal medications  I also explained patient risks and benefits of each treatment  Patient opted for topical anti-fungal  Rx Panlac solution and went over the application protocol  Patient agrees with plan and understands  All questions and concerns were addressed  - -calluses were sharply trimmed to normal epithelium with a 15 blade without incident  patient was instructed to use a pumice stone at home to reduce the growth of the callus  Rx Urea cream to be applied to the feet daily  - return in 3-4 months   - Call if any questions     Subjective:      Patient ID: Alden Coronado is a 68 y o  male  Alden Coronado 68year old male with no significant PMH presents with left foot pain due to porokeratosis and dry skin  Reports applying pressure to the feet causes pain due the lesions  He also presents with discolored toenails for which he will like to be treated for  He denies any other complaints related foot and ankles  Denies n/v/f/chills  The following portions of the patient's history were reviewed and updated as appropriate:   He  has no past medical history on file  He   Patient Active Problem List    Diagnosis Date Noted    Rupture of globe of eye following blunt trauma, left, initial encounter 12/05/2018    Abrasion 12/05/2018     He  has a past surgical history that includes Hernia repair       Review of Systems   Constitutional: Negative for chills and fever  Respiratory: Negative for apnea  Gastrointestinal: Negative for diarrhea, nausea and vomiting  Musculoskeletal: Positive for arthralgias  Skin: Positive for color change  Neurological: Negative for dizziness  Psychiatric/Behavioral: Negative for agitation  Objective:      /66 (BP Location: Left arm, Patient Position: Sitting, Cuff Size: Standard)   Pulse 66   Ht 6' (1 829 m)   Wt 88 kg (194 lb)   SpO2 94%   BMI 26 31 kg/m²          Physical Exam  Vitals reviewed  HENT:      Head: Atraumatic  Cardiovascular:      Rate and Rhythm: Normal rate  Pulses: Normal pulses  Dorsalis pedis pulses are 2+ on the right side and 2+ on the left side  Posterior tibial pulses are 2+ on the right side and 2+ on the left side  Pulmonary:      Effort: Pulmonary effort is normal    Musculoskeletal:         General: Tenderness present  Feet:      Right foot:      Protective Sensation: 10 sites tested  10 sites sensed  Skin integrity: Dry skin present  Toenail Condition: Right toenails are abnormally thick  Left foot:      Protective Sensation: 10 sites tested  10 sites sensed  Skin integrity: Callus and dry skin present  Toenail Condition: Left toenails are abnormally thick  Comments: B/l hallux toenails are discolored, thick with subungual debris noted  Multiple punctate porokeratosis type of lesions noted plantar forefoot  Pain with palpation  Moderately dry feet  Skin:     General: Skin is warm and dry  Capillary Refill: Capillary refill takes less than 2 seconds  Neurological:      General: No focal deficit present  Mental Status: He is alert     Psychiatric:         Mood and Affect: Mood normal        Lesion Destruction    Date/Time: 4/8/2022 1:04 PM  Performed by: Myah Reed DPM  Authorized by: Myah Reed DPM   Universal Protocol:  Consent: Verbal consent obtained    Risks and benefits: risks, benefits and alternatives were discussed  Consent given by: patient  Patient understanding: patient states understanding of the procedure being performed  Patient identity confirmed: verbally with patient      Procedure Details - Lesion Destruction:     Number of Lesions:  1  Lesion 1:     Body area:  Lower extremity    Lower extremity location:  L foot    Malignancy: benign hyperkeratotic lesion      Destruction method: scissors used for extraction

## 2022-07-01 ENCOUNTER — TELEPHONE (OUTPATIENT)
Dept: OBGYN CLINIC | Facility: CLINIC | Age: 77
End: 2022-07-01

## 2022-07-11 ENCOUNTER — OFFICE VISIT (OUTPATIENT)
Dept: PODIATRY | Facility: CLINIC | Age: 77
End: 2022-07-11

## 2022-07-11 VITALS
BODY MASS INDEX: 25.73 KG/M2 | OXYGEN SATURATION: 96 % | SYSTOLIC BLOOD PRESSURE: 122 MMHG | DIASTOLIC BLOOD PRESSURE: 70 MMHG | WEIGHT: 190 LBS | HEIGHT: 72 IN | HEART RATE: 61 BPM

## 2022-07-11 DIAGNOSIS — L84 CALLUS OF FOOT: Primary | ICD-10-CM

## 2022-07-11 PROCEDURE — NCFTCARE PR NON-COVERED FOOT CARE: Performed by: STUDENT IN AN ORGANIZED HEALTH CARE EDUCATION/TRAINING PROGRAM

## 2022-07-11 NOTE — PROGRESS NOTES
Assessment/Plan:    No problem-specific Assessment & Plan notes found for this encounter  Diagnoses and all orders for this visit:    Callus of foot      plan:     Patient was counseled and educated on the condition and the diagnosis  The diagnosis, treatment options and prognosis were discussed in detail    - Hyperkeratotic lesions were sharply trimmed to normal epithelium with a 15 blade without incident  Discussed off-loading devices to decrease the growth of the calluses such as callus pads and foot ware modification  Patient was instructed to use a pumice stone at home to reduce the growth of the callus as well as OTC lotion or prescription to their feet  - return in 3 months for follow-up     Subjective:      Patient ID: Marcia Duffy is a 68 y o  male  24-year-old male with no significant past medical history presents with complaint of left 2nd digit pain secondary to hyperkeratosis  Patient has not done anything at home to treat the hyperkeratosis  Has been using over-the-counter and prescribed urea lotion which helps with the dry skin  He denies any other complaints at this time  Denies nausea vomiting fever chills shortness of breath  The following portions of the patient's history were reviewed and updated as appropriate: He  has no past medical history on file  He   Patient Active Problem List    Diagnosis Date Noted    Rupture of globe of eye following blunt trauma, left, initial encounter 12/05/2018    Abrasion 12/05/2018     He  has a past surgical history that includes Hernia repair       Review of Systems   Constitutional: Negative for chills  Respiratory: Negative for shortness of breath  Gastrointestinal: Negative for diarrhea  Musculoskeletal: Positive for arthralgias  Skin: Positive for color change  Neurological: Negative for dizziness  Psychiatric/Behavioral: Negative for agitation           Objective:      /70 (BP Location: Left arm, Patient Position: Sitting, Cuff Size: Standard)   Pulse 61   Ht 6' (1 829 m)   Wt 86 2 kg (190 lb)   SpO2 96%   BMI 25 77 kg/m²          Physical Exam  Vitals reviewed  Cardiovascular:      Rate and Rhythm: Normal rate  Pulses: Normal pulses  Pulmonary:      Effort: Pulmonary effort is normal    Musculoskeletal:         General: Tenderness present  Feet:      Left foot:      Skin integrity: Callus present  Comments: Left plantar medial 2nd digit hyperkeratotic lesion noted  Pain with palpation  No signs of open lesions  Overall moderate to severe xerotic feet bilateral  Skin:     General: Skin is warm and dry  Neurological:      General: No focal deficit present  Mental Status: He is alert     Psychiatric:         Mood and Affect: Mood normal

## 2022-10-04 ENCOUNTER — HOSPITAL ENCOUNTER (EMERGENCY)
Facility: HOSPITAL | Age: 77
Discharge: HOME | End: 2022-10-04
Attending: EMERGENCY MEDICINE
Payer: MEDICARE

## 2022-10-04 ENCOUNTER — APPOINTMENT (EMERGENCY)
Dept: CT IMAGING | Facility: HOSPITAL | Age: 77
End: 2022-10-04
Payer: MEDICARE

## 2022-10-04 VITALS
WEIGHT: 190 LBS | HEART RATE: 62 BPM | TEMPERATURE: 97.1 F | BODY MASS INDEX: 25.77 KG/M2 | SYSTOLIC BLOOD PRESSURE: 161 MMHG | DIASTOLIC BLOOD PRESSURE: 72 MMHG | OXYGEN SATURATION: 97 % | RESPIRATION RATE: 18 BRPM

## 2022-10-04 DIAGNOSIS — R10.11 RIGHT UPPER QUADRANT ABDOMINAL PAIN: Primary | ICD-10-CM

## 2022-10-04 LAB
ALBUMIN SERPL BCP-MCNC: 3.7 G/DL (ref 3.5–5)
ALP SERPL-CCNC: 70 U/L (ref 46–116)
ALT SERPL W P-5'-P-CCNC: 22 U/L (ref 12–78)
ANION GAP SERPL CALCULATED.3IONS-SCNC: 6 MMOL/L (ref 4–13)
AST SERPL W P-5'-P-CCNC: 17 U/L (ref 5–45)
ATRIAL RATE: 61 BPM
BASOPHILS # BLD AUTO: 0.03 THOUSANDS/ΜL (ref 0–0.1)
BASOPHILS NFR BLD AUTO: 1 % (ref 0–1)
BILIRUB SERPL-MCNC: 0.74 MG/DL (ref 0.2–1)
BUN SERPL-MCNC: 21 MG/DL (ref 5–25)
CALCIUM SERPL-MCNC: 8.8 MG/DL (ref 8.3–10.1)
CARDIAC TROPONIN I PNL SERPL HS: 7 NG/L
CHLORIDE SERPL-SCNC: 107 MMOL/L (ref 96–108)
CO2 SERPL-SCNC: 29 MMOL/L (ref 21–32)
CREAT SERPL-MCNC: 1.02 MG/DL (ref 0.6–1.3)
EOSINOPHIL # BLD AUTO: 0.11 THOUSAND/ΜL (ref 0–0.61)
EOSINOPHIL NFR BLD AUTO: 2 % (ref 0–6)
ERYTHROCYTE [DISTWIDTH] IN BLOOD BY AUTOMATED COUNT: 13 % (ref 11.6–15.1)
GFR SERPL CREATININE-BSD FRML MDRD: 71 ML/MIN/1.73SQ M
GLUCOSE SERPL-MCNC: 98 MG/DL (ref 65–140)
HCT VFR BLD AUTO: 45.8 % (ref 36.5–49.3)
HGB BLD-MCNC: 15.3 G/DL (ref 12–17)
IMM GRANULOCYTES # BLD AUTO: 0.01 THOUSAND/UL (ref 0–0.2)
IMM GRANULOCYTES NFR BLD AUTO: 0 % (ref 0–2)
LIPASE SERPL-CCNC: 142 U/L (ref 73–393)
LYMPHOCYTES # BLD AUTO: 1.6 THOUSANDS/ΜL (ref 0.6–4.47)
LYMPHOCYTES NFR BLD AUTO: 26 % (ref 14–44)
MCH RBC QN AUTO: 32.6 PG (ref 26.8–34.3)
MCHC RBC AUTO-ENTMCNC: 33.4 G/DL (ref 31.4–37.4)
MCV RBC AUTO: 97 FL (ref 82–98)
MONOCYTES # BLD AUTO: 0.54 THOUSAND/ΜL (ref 0.17–1.22)
MONOCYTES NFR BLD AUTO: 9 % (ref 4–12)
NEUTROPHILS # BLD AUTO: 3.76 THOUSANDS/ΜL (ref 1.85–7.62)
NEUTS SEG NFR BLD AUTO: 62 % (ref 43–75)
NRBC BLD AUTO-RTO: 0 /100 WBCS
P AXIS: 43 DEGREES
PLATELET # BLD AUTO: 190 THOUSANDS/UL (ref 149–390)
PMV BLD AUTO: 10.4 FL (ref 8.9–12.7)
POTASSIUM SERPL-SCNC: 4 MMOL/L (ref 3.5–5.3)
PR INTERVAL: 240 MS
PROT SERPL-MCNC: 7.4 G/DL (ref 6.4–8.4)
QRS AXIS: -32 DEGREES
QRSD INTERVAL: 112 MS
QT INTERVAL: 422 MS
QTC INTERVAL: 424 MS
RBC # BLD AUTO: 4.7 MILLION/UL (ref 3.88–5.62)
SODIUM SERPL-SCNC: 142 MMOL/L (ref 135–147)
T WAVE AXIS: 4 DEGREES
VENTRICULAR RATE: 61 BPM
WBC # BLD AUTO: 6.05 THOUSAND/UL (ref 4.31–10.16)

## 2022-10-04 PROCEDURE — 84484 ASSAY OF TROPONIN QUANT: CPT | Performed by: EMERGENCY MEDICINE

## 2022-10-04 PROCEDURE — 71260 CT THORAX DX C+: CPT

## 2022-10-04 PROCEDURE — 83690 ASSAY OF LIPASE: CPT | Performed by: EMERGENCY MEDICINE

## 2022-10-04 PROCEDURE — 80053 COMPREHEN METABOLIC PANEL: CPT | Performed by: EMERGENCY MEDICINE

## 2022-10-04 PROCEDURE — 99284 EMERGENCY DEPT VISIT MOD MDM: CPT

## 2022-10-04 PROCEDURE — 93005 ELECTROCARDIOGRAM TRACING: CPT

## 2022-10-04 PROCEDURE — 96360 HYDRATION IV INFUSION INIT: CPT

## 2022-10-04 PROCEDURE — 36415 COLL VENOUS BLD VENIPUNCTURE: CPT | Performed by: EMERGENCY MEDICINE

## 2022-10-04 PROCEDURE — 99284 EMERGENCY DEPT VISIT MOD MDM: CPT | Performed by: EMERGENCY MEDICINE

## 2022-10-04 PROCEDURE — 85025 COMPLETE CBC W/AUTO DIFF WBC: CPT | Performed by: EMERGENCY MEDICINE

## 2022-10-04 PROCEDURE — 96361 HYDRATE IV INFUSION ADD-ON: CPT

## 2022-10-04 PROCEDURE — 93010 ELECTROCARDIOGRAM REPORT: CPT | Performed by: INTERNAL MEDICINE

## 2022-10-04 PROCEDURE — 74177 CT ABD & PELVIS W/CONTRAST: CPT

## 2022-10-04 RX ORDER — ACETAMINOPHEN 325 MG/1
650 TABLET ORAL ONCE
Status: COMPLETED | OUTPATIENT
Start: 2022-10-04 | End: 2022-10-04

## 2022-10-04 RX ORDER — NAPROXEN 500 MG/1
500 TABLET ORAL 2 TIMES DAILY WITH MEALS
Qty: 20 TABLET | Refills: 0 | Status: SHIPPED | OUTPATIENT
Start: 2022-10-04

## 2022-10-04 RX ADMIN — ACETAMINOPHEN 650 MG: 325 TABLET, FILM COATED ORAL at 14:09

## 2022-10-04 RX ADMIN — SODIUM CHLORIDE 1000 ML: 0.9 INJECTION, SOLUTION INTRAVENOUS at 14:10

## 2022-10-04 RX ADMIN — IOHEXOL 100 ML: 300 INJECTION, SOLUTION INTRAVENOUS at 15:10

## 2022-10-04 NOTE — LETTER
Martin Purcell was seen and treated in our emergency department on 10/4/2022  He may return to work on 10/10/2022  If you have any questions or concerns, please don't hesitate to call                  Loulou Salcedo, ALMA DELIAN, RN

## 2022-10-04 NOTE — ED PROVIDER NOTES
History  Chief Complaint   Patient presents with   • Abdominal Pain     Pt reports RUQ pain that radiates into back that started 1 week ago  Pt states it feels like a pulled muscle     68 y o  M presents w RUQ pain x 1 week   Radiates to flank  Worse w lateral rotational movement  Feels consistent w pulled muscle - he lifts things for work  But no known injury  No CP, no SOB, No blood in urine or dysuria  No N/V/D/C  No attempted meds at home  Prior hernia repair but no other abdominal pain  Prior to Admission Medications   Prescriptions Last Dose Informant Patient Reported? Taking?   aspirin (ECOTRIN) 325 mg EC tablet   No No   Sig: Take 1 tablet (325 mg total) by mouth daily   Patient not taking: No sig reported   ciclopirox (PENLAC) 8 % solution   No No   Sig: Apply topically daily at bedtime   Patient not taking: Reported on 7/11/2022   urea (CARMOL) 40 %   No No   Sig: Apply topically daily   Patient not taking: Reported on 7/11/2022      Facility-Administered Medications: None       History reviewed  No pertinent past medical history  Past Surgical History:   Procedure Laterality Date   • HERNIA REPAIR         History reviewed  No pertinent family history  I have reviewed and agree with the history as documented  E-Cigarette/Vaping   • E-Cigarette Use Never User      E-Cigarette/Vaping Substances   • Nicotine No    • THC No    • CBD No    • Flavoring No    • Other No    • Unknown No      Social History     Tobacco Use   • Smoking status: Former Smoker   • Smokeless tobacco: Never Used   • Tobacco comment: never smoker per [employer] Use   • Vaping Use: Never used   Substance Use Topics   • Alcohol use: No   • Drug use: No       Review of Systems   Constitutional: Negative for chills, fatigue and fever  HENT: Negative for congestion  Respiratory: Negative for apnea, cough, chest tightness and shortness of breath  Cardiovascular: Negative for chest pain and palpitations  Gastrointestinal: Positive for abdominal pain (Upper R abd pain)  Negative for constipation, diarrhea, nausea and vomiting  Genitourinary: Positive for flank pain (Radiates to R flank)  Negative for dysuria  Musculoskeletal: Negative for back pain and neck pain  Skin: Negative for color change and rash  Allergic/Immunologic: Negative for immunocompromised state  Neurological: Negative for dizziness, syncope and headaches  Physical Exam  Physical Exam  Vitals reviewed  Constitutional:       General: He is not in acute distress  Appearance: He is well-developed  He is not ill-appearing, toxic-appearing or diaphoretic  HENT:      Head: Normocephalic and atraumatic  Eyes:      General: No scleral icterus  Right eye: No discharge  Left eye: No discharge  Conjunctiva/sclera: Conjunctivae normal       Pupils: Pupils are equal, round, and reactive to light  Neck:      Vascular: No JVD  Cardiovascular:      Rate and Rhythm: Normal rate and regular rhythm  Heart sounds: Normal heart sounds  No murmur heard  No friction rub  No gallop  Pulmonary:      Effort: Pulmonary effort is normal  No respiratory distress  Breath sounds: Normal breath sounds  No wheezing or rales  Chest:      Chest wall: No tenderness  Abdominal:      General: Bowel sounds are normal  There is no distension  Palpations: Abdomen is soft  Tenderness: There is abdominal tenderness (mild tenderness to palpation RUQ  )  There is no right CVA tenderness, left CVA tenderness, guarding or rebound  Negative signs include Rangel's sign  Hernia: No hernia is present  Musculoskeletal:         General: No tenderness or deformity  Normal range of motion  Cervical back: Normal range of motion and neck supple  Skin:     General: Skin is warm and dry  Coloration: Skin is not pale  Findings: No erythema or rash  Comments: No rashes, no shingles      Neurological: Mental Status: He is alert and oriented to person, place, and time  Cranial Nerves: No cranial nerve deficit     Psychiatric:         Behavior: Behavior normal          Vital Signs  ED Triage Vitals   Temperature Pulse Respirations Blood Pressure SpO2   10/04/22 1228 10/04/22 1228 10/04/22 1228 10/04/22 1228 10/04/22 1228   (!) 97 1 °F (36 2 °C) 62 18 161/72 97 %      Temp Source Heart Rate Source Patient Position - Orthostatic VS BP Location FiO2 (%)   10/04/22 1228 10/04/22 1228 10/04/22 1228 10/04/22 1228 --   Temporal Monitor Sitting Left arm       Pain Score       10/04/22 1409       6           Vitals:    10/04/22 1228   BP: 161/72   Pulse: 62   Patient Position - Orthostatic VS: Sitting         Visual Acuity      ED Medications  Medications   sodium chloride 0 9 % bolus 1,000 mL (0 mL Intravenous Stopped 10/4/22 1631)   acetaminophen (TYLENOL) tablet 650 mg (650 mg Oral Given 10/4/22 1409)   iohexol (OMNIPAQUE) 300 mg/mL injection 100 mL (100 mL Intravenous Given 10/4/22 1510)       Diagnostic Studies  Results Reviewed     Procedure Component Value Units Date/Time    HS Troponin 0hr (reflex protocol) [[id number]]  (Normal) Collected: 10/04/22 1548    Lab Status: Final result Specimen: Blood from Arm, Left Updated: 10/04/22 1619     hs TnI 0hr 7 ng/L     Comprehensive metabolic panel [[id number]] Collected: 10/04/22 1359    Lab Status: Final result Specimen: Blood from Arm, Left Updated: 10/04/22 1437     Sodium 142 mmol/L      Potassium 4 0 mmol/L      Chloride 107 mmol/L      CO2 29 mmol/L      ANION GAP 6 mmol/L      BUN 21 mg/dL      Creatinine 1 02 mg/dL      Glucose 98 mg/dL      Calcium 8 8 mg/dL      AST 17 U/L      ALT 22 U/L      Alkaline Phosphatase 70 U/L      Total Protein 7 4 g/dL      Albumin 3 7 g/dL      Total Bilirubin 0 74 mg/dL      eGFR 71 ml/min/1 73sq m     Narrative:      [place] guidelines for Chronic Kidney Disease (CKD):   •  Stage 1 with normal or high GFR (GFR > 90 mL/min/1 73 square meters)  •  Stage 2 Mild CKD (GFR = 60-89 mL/min/1 73 square meters)  •  Stage 3A Moderate CKD (GFR = 45-59 mL/min/1 73 square meters)  •  Stage 3B Moderate CKD (GFR = 30-44 mL/min/1 73 square meters)  •  Stage 4 Severe CKD (GFR = 15-29 mL/min/1 73 square meters)  •  Stage 5 End Stage CKD (GFR <15 mL/min/1 73 square meters)  Note: GFR calculation is accurate only with a steady state creatinine    Lipase [[id number]]  (Normal) Collected: 10/04/22 1359    Lab Status: Final result Specimen: Blood from Arm, Left Updated: 10/04/22 1437     Lipase 142 u/L     CBC and differential [[id number]] Collected: 10/04/22 1359    Lab Status: Final result Specimen: Blood from Arm, Left Updated: 10/04/22 1412     WBC 6 05 Thousand/uL      RBC 4 70 Million/uL      Hemoglobin 15 3 g/dL      Hematocrit 45 8 %      MCV 97 fL      MCH 32 6 pg      MCHC 33 4 g/dL      RDW 13 0 %      MPV 10 4 fL      Platelets 256 Thousands/uL      nRBC 0 /100 WBCs      Neutrophils Relative 62 %      Immat GRANS % 0 %      Lymphocytes Relative 26 %      Monocytes Relative 9 %      Eosinophils Relative 2 %      Basophils Relative 1 %      Neutrophils Absolute 3 76 Thousands/µL      Immature Grans Absolute 0 01 Thousand/uL      Lymphocytes Absolute 1 60 Thousands/µL      Monocytes Absolute 0 54 Thousand/µL      Eosinophils Absolute 0 11 Thousand/µL      Basophils Absolute 0 03 Thousands/µL                  CT chest abdomen pelvis w contrast   Final Result by Delmy Mishra MD (10/04 1606)      No acute inflammatory stranding   No bowel obstruction   No pericholecystic fluid   No biliary dilatation   Enlarged prostate with prominent median lobe   5 mm lung nodule in the right middle lobe  Based on current Fleischner Society 2017 Guidelines on incidental pulmonary nodule, follow-up CT at 12 months can be considered  Evaluate if patient is a candidate for lung cancer screening        Workstation performed: [id number] Procedures  Procedures   EKG: Sinus rhythm w 1st degree AV block  No ischemia      ED Course  ED Course as of 10/05/22 0713   Tue Oct 04, 2022   1523 Patient now stating that pain actually radiates up into chest   Will get trop / EKG     1622 hs TnI 0hr: 7  Considering pain has been ongoing for one week, a single negative ACS work up sufficient to r/o ACS  65 Advised patient of lung nodule and need for follow up                                             MDM  Number of Diagnoses or Management Options  Right upper quadrant abdominal pain  Diagnosis management comments: 68 y o  M w RUQ pain  Will do abdominal labs  Tylenol for pain control  DDX: msk pain, intra-abd, pancreatitis, appendicitis, pyelonephritis (low suspicion w no urinary sx), lower pneumonia, low concern for shingles w normal skin appearance, kidney stone, peptic ulcer  Abd work up, ACS work up negative  Likely MSK pain  Discussed lung nodule and need for follow up w PCP  Discussed with patient and they understood the risks and benefits of discharge  Patient had opportunity to ask questions regarding care and discharge instructions and had no further questions  Advised follow up with PCP, advised returning if worsening, and discussed disease specific return precautions  Patient understood discharge instructions            Amount and/or Complexity of Data Reviewed  Clinical lab tests: ordered and reviewed  Tests in the radiology section of CPT®: ordered and reviewed        Disposition  Final diagnoses:   Right upper quadrant abdominal pain     Time reflects when diagnosis was documented in both MDM as applicable and the Disposition within this note     Time User Action Codes Description Comment    10/4/2022  4:25 PM Roma Garcia Add [R10 11] Right upper quadrant abdominal pain       ED Disposition     ED Disposition   Discharge    Condition   Stable    Date/Time   Tue Oct 4, 2022  4:24 PM    Comment   Cullen Hsieh discharge to home/self care  Follow-up Information     Follow up With Specialties Details Why [address] [facility] Internal Medicine Schedule an appointment as soon as possible for a visit  For follow up to ensure improvement, and for further testing and treatment as needed [address]  [phone]            Discharge Medication List as of 10/4/2022  4:27 PM      START taking these medications    Details   naproxen (Naprosyn) 500 mg tablet Take 1 tablet (500 mg total) by mouth 2 (two) times a day with meals As needed for pain, Starting Tue 10/4/2022, Normal         CONTINUE these medications which have NOT CHANGED    Details   aspirin (ECOTRIN) 325 mg EC tablet Take 1 tablet (325 mg total) by mouth daily, Starting Fri 6/22/2018, Print      ciclopirox (PENLAC) 8 % solution Apply topically daily at bedtime, Starting Fri 4/8/2022, Normal      urea (CARMOL) 40 % Apply topically daily, Starting Fri 4/8/2022, Normal             No discharge procedures on file      PDMP Review     None          ED Provider  Electronically Signed by           Talita Dimas DO  10/05/22 3278

## 2022-10-04 NOTE — DISCHARGE INSTRUCTIONS
Please follow up w PCP - you have a lung nodule which will require follow up  Imaging is normal otherwise, likely pulled muscle from work  Use antiinflammatory meds / tylenol for pain control     Return to ED if change in pain, severe pain, chest pain, etc

## 2022-10-19 ENCOUNTER — OFFICE VISIT (OUTPATIENT)
Dept: PODIATRY | Facility: CLINIC | Age: 77
End: 2022-10-19
Payer: MEDICARE

## 2022-10-19 VITALS
HEIGHT: 72 IN | DIASTOLIC BLOOD PRESSURE: 70 MMHG | SYSTOLIC BLOOD PRESSURE: 130 MMHG | BODY MASS INDEX: 25.73 KG/M2 | WEIGHT: 190 LBS

## 2022-10-19 DIAGNOSIS — I87.2 VENOUS INSUFFICIENCY OF BOTH LOWER EXTREMITIES: ICD-10-CM

## 2022-10-19 DIAGNOSIS — L84 CALLUS OF FOOT: Primary | ICD-10-CM

## 2022-10-19 PROCEDURE — 11056 PARNG/CUTG B9 HYPRKR LES 2-4: CPT | Performed by: STUDENT IN AN ORGANIZED HEALTH CARE EDUCATION/TRAINING PROGRAM

## 2022-10-19 PROCEDURE — 99213 OFFICE O/P EST LOW 20 MIN: CPT | Performed by: STUDENT IN AN ORGANIZED HEALTH CARE EDUCATION/TRAINING PROGRAM

## 2022-10-20 NOTE — PROGRESS NOTES
Assessment/Plan:    No problem-specific Assessment & Plan notes found for this encounter  Diagnoses and all orders for this visit:    Callus of foot    Venous insufficiency of both lower extremities      Plan:    -  Patient was counseled and educated on the condition and the diagnosis  The diagnosis, treatment options and prognosis were discussed in detail    -  Hyperkeratotic lesions were sharply trimmed to normal epithelium with a 15 blade without incident  Discussed off-loading devices to decrease the growth of the calluses such as callus pads and foot ware modification  Patient was instructed to use a pumice stone at home to reduce the growth of the callus as well as OTC lotion or prescription to their feet  - I discussed importance of compression stockings and leg elevation to minimize venous insuffiencey related complications such as ulcerations  I explained to patient in detail causes of venous insuffiencey and prevention  Patient expresses understanding    - return in 3 months       Subjective:      Patient ID: Alban Tyson is a 68 y o  male  68year old male presents with complaints of painful calluses to both of his feet and edema to the lower leg  Patient reports calluses are painful to walk on  No other complaints  Denies n/v/f/chills  The following portions of the patient's history were reviewed and updated as appropriate:   He  has no past medical history on file  He   Patient Active Problem List    Diagnosis Date Noted   • Rupture of globe of eye following blunt trauma, left, initial encounter 12/05/2018   • Abrasion 12/05/2018     He  has a past surgical history that includes Hernia repair       Review of Systems   Constitutional: Negative for chills  Respiratory: Negative for shortness of breath  Gastrointestinal: Negative for vomiting  Musculoskeletal: Positive for arthralgias  Skin: Negative for color change  Neurological: Negative for dizziness  Psychiatric/Behavioral: Negative for agitation  Objective:      /70 (BP Location: Left arm, Patient Position: Sitting, Cuff Size: Standard)   Ht 6' (1 829 m)   Wt 86 2 kg (190 lb)   BMI 25 77 kg/m²          Physical Exam  Vitals reviewed  Cardiovascular:      Rate and Rhythm: Normal rate  Pulses: Normal pulses  Pulmonary:      Effort: Pulmonary effort is normal    Feet:      Right foot:      Skin integrity: Callus present  Left foot:      Skin integrity: Callus present  Comments: Left plantar medial 2nd digit hyperkeratotic lesion noted  B/l submet head hyperkeratosis noted  Pain with palpation  No signs of open lesions  Overall moderate to severe xerotic feet bilateral  +2 pitting edema to lower extremities  Skin:     General: Skin is warm and dry  Neurological:      General: No focal deficit present  Mental Status: He is alert  Psychiatric:         Mood and Affect: Mood normal        Lesion Destruction    Date/Time: 10/19/2022 4:17 PM  Performed by: Sharon Waggoner DPM  Authorized by: Sharon Waggoner DPM   Universal Protocol:  Consent: Verbal consent obtained    Risks and benefits: risks, benefits and alternatives were discussed  Consent given by: patient  Patient understanding: patient states understanding of the procedure being performed  Patient identity confirmed: verbally with patient      Procedure Details - Lesion Destruction:     Number of Lesions:  3  Lesion 1:     Body area:  Lower extremity    Lower extremity location:  L second toe    Malignancy: benign hyperkeratotic lesion      Destruction method: scissors used for extraction    Lesion 2:     Body area:  Lower extremity    Lower extremity location:  R foot    Malignancy: benign hyperkeratotic lesion      Destruction method: scissors used for extraction    Lesion 3:     Body area:  Lower extremity    Lower extremity location:  L foot    Malignancy: benign hyperkeratotic lesion      Destruction method: scissors used for extraction

## 2023-01-27 ENCOUNTER — OFFICE VISIT (OUTPATIENT)
Dept: PODIATRY | Facility: CLINIC | Age: 78
End: 2023-01-27

## 2023-01-27 VITALS
SYSTOLIC BLOOD PRESSURE: 185 MMHG | HEIGHT: 72 IN | BODY MASS INDEX: 25.73 KG/M2 | HEART RATE: 63 BPM | WEIGHT: 190 LBS | DIASTOLIC BLOOD PRESSURE: 73 MMHG

## 2023-01-27 DIAGNOSIS — B35.1 ONYCHOMYCOSIS: ICD-10-CM

## 2023-01-27 DIAGNOSIS — I87.2 VENOUS INSUFFICIENCY OF BOTH LOWER EXTREMITIES: Primary | ICD-10-CM

## 2023-01-27 DIAGNOSIS — L84 CALLUS OF FOOT: ICD-10-CM

## 2023-01-28 NOTE — PROGRESS NOTES
Assessment/Plan:    No problem-specific Assessment & Plan notes found for this encounter  Diagnoses and all orders for this visit:    Venous insufficiency of both lower extremities  -     Lesion Destruction    Callus of foot  -     Lesion Destruction    Onychomycosis        Plan:     - A thorough neurovascular exam was performed  Patient presents for at-risk foot care  Patient has no acute concerns today  Patient has significant lower extremity risk due to neuropathy, parasthesia, edema, and trophic skin changes to the lower extremity  Patient has Q9  findings and is recommended for at risk foot care every 3 months  - All 10 toenails were debridement as follow: using nail nipper, kal, and curette, nails were sharply debrided, reduced in thickness and length  Devitalized nail tissue and fungal debris excised and removed  Patient tolerated well  -  Hyperkeratotic lesions were sharply trimmed to normal epithelium with a 15 blade without incident  Discussed off-loading devices to decrease the growth of the calluses such as callus pads and foot ware modification  Patient was instructed to use a pumice stone at home to reduce the growth of the callus as well as OTC lotion or prescription to their feet  - I discussed importance of compression stockings and leg elevation to minimize venous insuffiencey related complications such as ulcerations  I explained to patient in detail causes of venous insuffiencey and prevention  Patient expresses understanding    - return in 3 months     Subjective:      Patient ID: Lana Cohen is a 68 y o  male  HPI:  Lana Cohen is a 68 y o  male who presents with painful, elongated toenails and multiple foot calluses  They have difficulty applying their socks and shoes due to the elongation of the nails and painful calluses  The pressure within their shoe gear is painful and they have been unable to cut their nails and shave/pumice stone the calluses adequately   Patient states pain is 1/10 in shoe gear  Pain with pressure  Requires at risk foot care  Denies other complaints related foot and ankle  The following portions of the patient's history were reviewed and updated as appropriate:   He  has no past medical history on file  He   Patient Active Problem List    Diagnosis Date Noted   • Rupture of globe of eye following blunt trauma, left, initial encounter 12/05/2018   • Abrasion 12/05/2018     He  has a past surgical history that includes Hernia repair       Review of Systems   All other systems reviewed and are negative  Objective:      BP (!) 185/73 (BP Location: Left arm, Patient Position: Sitting, Cuff Size: Large)   Pulse 63   Ht 6' (1 829 m)   Wt 86 2 kg (190 lb)   BMI 25 77 kg/m²          Physical Exam  Vitals reviewed  Cardiovascular:      Rate and Rhythm: Normal rate  Pulses:           Dorsalis pedis pulses are 1+ on the right side and 1+ on the left side  Posterior tibial pulses are 1+ on the right side and 1+ on the left side  Musculoskeletal:         General: Swelling present  Normal range of motion  Feet:      Right foot:      Protective Sensation: 10 sites tested  9 sites sensed  Skin integrity: Callus present  Toenail Condition: Right toenails are abnormally thick and long  Fungal disease present  Left foot:      Protective Sensation: 10 sites tested  8 sites sensed  Skin integrity: Callus present  Toenail Condition: Left toenails are abnormally thick and long  Fungal disease present  Comments: On exam patient has thickened, hypertrophic, discolored, brittle toenails with subungual debris and tenderness x10  Callus: left 2nd digit proximal to PIPJ and Right 1st MTPJ  Patient has lower extremity edema  PAtients skin is atrophic, thickened nails, and decreased pedal hair  Patient has decreased pinprick and vibratory sensation to his feet and parasthesia    Skin:     General: Skin is warm     Neurological: General: No focal deficit present  Mental Status: He is alert  Psychiatric:         Mood and Affect: Mood normal          Thought Content: Thought content normal        Lesion Destruction    Date/Time: 1/27/2023 5:33 PM  Performed by: Peace Gonzales DPM  Authorized by: Peace Gonzales DPM   Universal Protocol:  Consent: Verbal consent obtained    Risks and benefits: risks, benefits and alternatives were discussed  Consent given by: patient  Patient understanding: patient states understanding of the procedure being performed  Patient identity confirmed: verbally with patient      Procedure Details - Lesion Destruction:     Number of Lesions:  2  Lesion 1:     Body area:  Lower extremity    Lower extremity location:  R foot    Malignancy: benign hyperkeratotic lesion      Destruction method: scissors used for extraction    Lesion 2:     Body area:  Lower extremity    Lower extremity location:  L foot    Malignancy: benign hyperkeratotic lesion      Destruction method: scissors used for extraction

## 2023-04-28 ENCOUNTER — OFFICE VISIT (OUTPATIENT)
Dept: PODIATRY | Facility: CLINIC | Age: 78
End: 2023-04-28

## 2023-04-28 VITALS
HEART RATE: 62 BPM | WEIGHT: 190 LBS | SYSTOLIC BLOOD PRESSURE: 146 MMHG | DIASTOLIC BLOOD PRESSURE: 74 MMHG | HEIGHT: 72 IN | BODY MASS INDEX: 25.73 KG/M2

## 2023-04-28 DIAGNOSIS — L84 CALLUS OF FOOT: ICD-10-CM

## 2023-04-28 DIAGNOSIS — B35.1 ONYCHOMYCOSIS: ICD-10-CM

## 2023-04-28 DIAGNOSIS — I87.2 VENOUS INSUFFICIENCY OF BOTH LOWER EXTREMITIES: Primary | ICD-10-CM

## 2023-04-29 NOTE — PROGRESS NOTES
Assessment/Plan:    No problem-specific Assessment & Plan notes found for this encounter  Diagnoses and all orders for this visit:    Venous insufficiency of both lower extremities    Callus of foot    Onychomycosis      Plan    - A thorough neurovascular exam was performed  Patient presents for at-risk foot care  Patient has no acute concerns today  Patient has significant lower extremity risk due to neuropathy, parasthesia, edema, and trophic skin changes to the lower extremity  Patient has Q9  findings and is recommended for at risk foot care every 3 months  - All 10 toenails were debridement as follow: using nail nipper, kal, and curette, nails were sharply debrided, reduced in thickness and length  Devitalized nail tissue and fungal debris excised and removed  Patient tolerated well  -  Hyperkeratotic lesions were sharply trimmed to normal epithelium with a 15 blade without incident  Discussed off-loading devices to decrease the growth of the calluses such as callus pads and foot ware modification  Patient was instructed to use a pumice stone at home to reduce the growth of the callus as well as OTC lotion or prescription to their feet  - I discussed importance of compression stockings and leg elevation to minimize venous insuffiencey related complications such as ulcerations  I explained to patient in detail causes of venous insuffiencey and prevention  Patient expresses understanding    - return in 3 months        Subjective:      Patient ID: Rachel Mckeon is a 68 y o  male  HPI:  Rachel Mckeon is a 68 y o  male who presents with painful, elongated toenails and calluses  They have difficulty applying their socks and shoes due to the elongation of the nails  The pressure within their shoe gear is painful and they have been unable to cut their nails adequately  Patient states pain is 1/10 in shoe gear  Pain with pressure  Requires at risk foot care           The following portions of the patient's "history were reviewed and updated as appropriate:   He  has no past medical history on file  He   Patient Active Problem List    Diagnosis Date Noted   • Rupture of globe of eye following blunt trauma, left, initial encounter 12/05/2018   • Abrasion 12/05/2018     He  has a past surgical history that includes Hernia repair       Review of Systems   All other systems reviewed and are negative  Objective:      /74   Pulse 62   Ht 6' (1 829 m)   Wt 86 2 kg (190 lb)   BMI 25 77 kg/m²          Physical Exam  Feet:      Comments: On exam patient has thickened, hypertrophic, discolored, brittle toenails with subungual debris and tenderness x10  Callus: B/l submet head 2, Right 1st MTPJ  Patient has lower extremity edema  PAtients skin is atrophic, thickened nails, and decreased pedal hair  Patient has decreased pinprick and vibratory sensation to his feet and parasthesia      Lesion Destruction    Date/Time: 4/28/2023 4:15 PM  Performed by: Quoc Adair DPM  Authorized by: Quoc Adair DPM   Universal Protocol:  Consent: Verbal consent obtained  Risks and benefits: risks, benefits and alternatives were discussed  Consent given by: patient  Time out: Immediately prior to procedure a \"time out\" was called to verify the correct patient, procedure, equipment, support staff and site/side marked as required    Patient understanding: patient states understanding of the procedure being performed  Patient identity confirmed: verbally with patient      Procedure Details - Lesion Destruction:     Number of Lesions:  3  Lesion 1:     Body area:  Lower extremity    Lower extremity location:  R foot    Malignancy: benign hyperkeratotic lesion      Destruction method: scissors used for extraction    Lesion 2:     Body area:  Lower extremity    Lower extremity location:  R foot    Malignancy: benign hyperkeratotic lesion      Destruction method: scissors used for extraction    Lesion 3:     Body area:  Lower extremity   " Lower extremity location:  L foot    Malignancy: benign hyperkeratotic lesion      Destruction method: scissors used for extraction

## 2023-07-20 ENCOUNTER — APPOINTMENT (OUTPATIENT)
Dept: RADIOLOGY | Facility: CLINIC | Age: 78
End: 2023-07-20
Payer: MEDICARE

## 2023-07-20 ENCOUNTER — OFFICE VISIT (OUTPATIENT)
Dept: URGENT CARE | Facility: CLINIC | Age: 78
End: 2023-07-20
Payer: MEDICARE

## 2023-07-20 VITALS
SYSTOLIC BLOOD PRESSURE: 164 MMHG | DIASTOLIC BLOOD PRESSURE: 80 MMHG | HEART RATE: 58 BPM | OXYGEN SATURATION: 95 % | TEMPERATURE: 97.9 F | RESPIRATION RATE: 18 BRPM

## 2023-07-20 DIAGNOSIS — S50.11XA CONTUSION OF RIGHT FOREARM, INITIAL ENCOUNTER: Primary | ICD-10-CM

## 2023-07-20 DIAGNOSIS — S49.91XA INJURY OF RIGHT SHOULDER, INITIAL ENCOUNTER: ICD-10-CM
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
## 2023-07-20 DIAGNOSIS — S50.11XA CONTUSION OF RIGHT FOREARM, INITIAL ENCOUNTER: ICD-10-CM

## 2023-07-20 PROCEDURE — 73090 X-RAY EXAM OF FOREARM: CPT

## 2023-07-20 PROCEDURE — 29125 APPL SHORT ARM SPLINT STATIC: CPT | Performed by: PHYSICIAN ASSISTANT

## 2023-07-20 PROCEDURE — 99213 OFFICE O/P EST LOW 20 MIN: CPT | Performed by: PHYSICIAN ASSISTANT

## 2023-07-20 PROCEDURE — G0463 HOSPITAL OUTPT CLINIC VISIT: HCPCS | Performed by: PHYSICIAN ASSISTANT

## 2023-07-20 NOTE — PROGRESS NOTES
[facility]        NAME: Tiffanie Cuevas is a 68 y.o. male  : 1945    MRN: [id number]  DATE: 2023  TIME: 7:23 PM    Assessment and Plan   Contusion of right forearm, initial encounter [S50.11XA]  1. Contusion of right forearm, initial encounter  XR forearm 2 vw right    Ambulatory referral to Orthopedic Surgery      2. Fall from ladder, initial encounter        3. Injury of right shoulder, initial encounter  Ambulatory referral to Orthopedic Surgery        Advised patient he should go to the ER for further evaluation given the injury as this was a significant fall and would be considered a trauma. Patient refused. I explained the risks of internal damage, injuries in other places, and risk of significant complications if left undiagnosed. Patient still refused. Signed AMA form. Was just concerned about the laceration and his forearm not the other areas. X-rayed the area out of courteous. Still advised he needs more evaluation. Patient Instructions   Patient Instructions   Close follow up with ortho and your PCP in the next 1-2 days. Any new or worsening symptoms proceed to ER immediately        Follow up with PCP in 3-5 days. Proceed to  ER if symptoms worsen. Chief Complaint     Chief Complaint   Patient presents with   • Arm Pain     Patient fell off of a  ladder on . Landed on right arm. Swelling/bruising present and scabbed over laceration. Patient has tenderness to right hip. History of Present Illness       Patient presents after a fall from a ladder. States it "was a soft landing" but fell 15 to 18ft onto a wooden floor. Has a cut over the right forearm and is concerned. Also with "a little" TTP over the right hip. Some pain in the right shoulder when raising overhead, and right forearm pain around the laceration. Happened 9 days ago.    Denies hitting head, LOC, memory problems, headaches, visual disturbances, numbness/tingling, chest pains, SOB, dyspnea, weakness, abdominal pains. Review of Systems   Review of Systems   Constitutional: Negative for fatigue. HENT: Negative for ear discharge and rhinorrhea. Eyes: Negative for photophobia and visual disturbance. Respiratory: Negative for shortness of breath. Gastrointestinal: Negative for nausea and vomiting. Musculoskeletal: Positive for arthralgias. Negative for back pain, neck pain and neck stiffness. Skin: Positive for wound. Neurological: Negative for dizziness, syncope, speech difficulty, weakness, numbness and headaches. Psychiatric/Behavioral: Negative for confusion and sleep disturbance. Current Medications       Current Outpatient Medications:   •  aspirin (ECOTRIN) 325 mg EC tablet, Take 1 tablet (325 mg total) by mouth daily (Patient not taking: Reported on 4/8/2022), Disp: 30 tablet, Rfl: 0  •  ciclopirox (PENLAC) 8 % solution, Apply topically daily at bedtime (Patient not taking: Reported on 4/28/2023), Disp: 6.6 mL, Rfl: 2  •  naproxen (Naprosyn) 500 mg tablet, Take 1 tablet (500 mg total) by mouth 2 (two) times a day with meals As needed for pain (Patient not taking: Reported on 10/19/2022), Disp: 20 tablet, Rfl: 0  •  urea (CARMOL) 40 %, Apply topically daily (Patient not taking: Reported on 4/28/2023), Disp: 85 g, Rfl: 2    Current Allergies     Allergies as of 07/20/2023   • (No Known Allergies)            The following portions of the patient's history were reviewed and updated as appropriate: allergies, current medications, past family history, past medical history, past social history, past surgical history and problem list.     History reviewed. No pertinent past medical history. Past Surgical History:   Procedure Laterality Date   • HERNIA REPAIR         No family history on file. Medications have been verified.         Objective   /80   Pulse 58   Temp 97.9 °F (36.6 °C)   Resp 18   SpO2 95%          Physical Exam     Physical Exam  Constitutional:       General: He is not in acute distress. Appearance: Normal appearance. He is not ill-appearing or diaphoretic. HENT:      Head: Normocephalic. Right Ear: Tympanic membrane, ear canal and external ear normal.      Left Ear: Tympanic membrane, ear canal and external ear normal.      Nose: Nose normal.      Mouth/Throat:      Mouth: Mucous membranes are moist.      Pharynx: Oropharynx is clear. Eyes:      Extraocular Movements: Extraocular movements intact. Conjunctiva/sclera: Conjunctivae normal.      Pupils: Pupils are equal, round, and reactive to light. Cardiovascular:      Rate and Rhythm: Normal rate and regular rhythm. Heart sounds: Normal heart sounds. Pulmonary:      Effort: Pulmonary effort is normal.      Breath sounds: Normal breath sounds. Abdominal:      General: Abdomen is flat. Bowel sounds are normal.      Palpations: Abdomen is soft. Musculoskeletal:         General: Normal range of motion. Cervical back: Normal range of motion. Comments: Ecchymosis over the mid and distal forearm. Mild TTP over the areas. Pain with overhead movements of the shoulder. No TTP over the shoulder    Refused lower extremity exam.   Skin:     General: Skin is warm and dry. Comments: 4cm healing laceration over the right forearm. No significant surrounding erythema. No fluctuance or drainage noted. Neurological:      General: No focal deficit present. Mental Status: He is alert and oriented to person, place, and time. Cranial Nerves: Cranial nerves 2-12 are intact. Sensory: Sensation is intact. Motor: Motor function is intact. Coordination: Coordination is intact. Gait: Gait is intact. Deep Tendon Reflexes: Reflexes are normal and symmetric.    Psychiatric:         Mood and Affect: Mood normal.         Behavior: Behavior normal.           Orthopedic injury treatment    Date/Time: 7/20/2023 6:30 PM    Performed by: Hector Rodgers PA-C  Authorized by: Hector Rodgers PA-C    Patient Location:  [facility]  [facility] Protocol:  Consent: Verbal consent obtained. Risks and benefits: risks, benefits and alternatives were discussed  Consent given by: patient  Patient understanding: patient states understanding of the procedure being performed  Patient consent: the patient's understanding of the procedure matches consent given  Procedure consent: procedure consent matches procedure scheduled  Patient identity confirmed: verbally with patient      Injury location:  Wrist  Location details:  Right wrist  Injury type:   Soft tissue  Neurovascular status: Neurovascularly intact    Distal perfusion: normal    Neurological function: normal    Range of motion: normal    Immobilization:  Splint  Splint type:  Short arm splint, static (forearm to hand)  Supplies used:  Aluminum splint  Neurovascular status: Neurovascularly intact    Distal perfusion: normal    Neurological function: normal    Range of motion: normal    Patient tolerance:  Patient tolerated the procedure well with no immediate complications

## 2023-07-20 NOTE — PATIENT INSTRUCTIONS
Close follow up with ortho and your PCP in the next 1-2 days.   Any new or worsening symptoms proceed to ER immediately

## 2023-07-21 ENCOUNTER — TELEPHONE (OUTPATIENT)
Dept: URGENT CARE | Facility: CLINIC | Age: 78
End: 2023-07-21

## 2023-07-21 NOTE — TELEPHONE ENCOUNTER
Called and left VM requesting return call to discuss results of xray done on 7/20/23 at [facility]. Call back number provided.

## 2023-08-01 VITALS
HEART RATE: 70 BPM | DIASTOLIC BLOOD PRESSURE: 71 MMHG | SYSTOLIC BLOOD PRESSURE: 138 MMHG | HEIGHT: 72 IN | BODY MASS INDEX: 26.09 KG/M2 | WEIGHT: 192.6 LBS

## 2023-08-01 DIAGNOSIS — S49.91XA INJURY OF RIGHT SHOULDER, INITIAL ENCOUNTER: ICD-10-CM

## 2023-08-01 DIAGNOSIS — S50.11XA CONTUSION OF RIGHT FOREARM, INITIAL ENCOUNTER: Primary | ICD-10-CM

## 2023-08-01 PROCEDURE — 99204 OFFICE O/P NEW MOD 45 MIN: CPT | Performed by: ORTHOPAEDIC SURGERY

## 2023-08-01 NOTE — PROGRESS NOTES
[facility] [place] [address] [id number]       Natanael Mensah  [id number]  1945    ORTHOPAEDIC SURGERY OUTPATIENT NOTE  2023      HISTORY:  68 y.o. male presents for initial evaluation of right forearm. Patient was up on a ladder when both he and the ladder fell. DOI around 2023. Patient was seen at Urgent Care 2023. X-ray's of the forearm were obtained demonstrate small posterior olecranon enthesophyte. Patient states the majority of his pain is in his forearm, accompanied by pain and swelling at time of injury. Symptoms have improved since time of injury, he reports mild pain today. Patient denies pain at the elbow. History reviewed. No pertinent past medical history.     Past Surgical History:   Procedure Laterality Date   • HERNIA REPAIR         Social History     Socioeconomic History   • Marital status: /Civil Union     Spouse name: Not on file   • Number of children: Not on file   • Years of education: Not on file   • Highest education level: Not on file   Occupational History   • Not on file   Tobacco Use   • Smoking status: Former   • Smokeless tobacco: Never   • Tobacco comments:     never smoker per Georgina   Vaping Use   • Vaping Use: Never used   Substance and Sexual Activity   • Alcohol use: No   • Drug use: No   • Sexual activity: Not on file   Other Topics Concern   • Not on file   Social History Narrative    · Most recent tobacco use screenin2019      · Marital status:         · Exercise level:   Occasional      · Diet:   Regular      · General stress level:   Low      · Seat belts used routinely:   Yes      Social Determinants of Health     Financial Resource Strain: Not on file   Food Insecurity: Not on file   Transportation Needs: Not on file   Physical Activity: Not on file   Stress: Not on file   Social Connections: Not on file   Intimate Partner Violence: Not on file Housing Stability: Not on file       History reviewed. No pertinent family history. Patient's Medications   New Prescriptions    No medications on file   Previous Medications    ASPIRIN (ECOTRIN) 325 MG EC TABLET    Take 1 tablet (325 mg total) by mouth daily    CICLOPIROX (PENLAC) 8 % SOLUTION    Apply topically daily at bedtime    NAPROXEN (NAPROSYN) 500 MG TABLET    Take 1 tablet (500 mg total) by mouth 2 (two) times a day with meals As needed for pain    UREA (CARMOL) 40 %    Apply topically daily   Modified Medications    No medications on file   Discontinued Medications    No medications on file       No Known Allergies     /71   Pulse 70   Ht 6' (1.829 m)   Wt 87.4 kg (192 lb 9.6 oz)   BMI 26.12 kg/m²      REVIEW OF SYSTEMS:  Constitutional: Negative. HEENT: Negative. Respiratory: Negative. Skin: Negative. Neurological: Negative. Psychiatric/Behavioral: Negative. Musculoskeletal: Negative except for that mentioned in the HPI. Right Elbow Exam     Tenderness   The patient is experiencing no tenderness. Range of Motion   Extension: 0   Flexion: 120   Pronation: normal   Supination: normal     Muscle Strength   Pronation:  5/5   Supination:  5/5     Tests   Varus: negative  Valgus: negative    Other   Erythema: absent  Sensation: normal  Pulse: present      Right Shoulder Exam     Tenderness   The patient is experiencing no tenderness. Range of Motion   The patient has normal right shoulder ROM. Muscle Strength   The patient has normal right shoulder strength. Tests   Apprehension: negative  Cross arm: negative  Drop arm: negative    Other   Erythema: absent  Sensation: normal  Pulse: present    Comments:  (+) O'margy's              IMAGING: x-ray of the right elbow obtained 7/20/2023 demonstrate small posterior olecranon enthesophyte. No other acute osseous abnormalities      ASSESSMENT AND PLAN:  68 y.o. male with forearm contusion.  Patient is already improving since injury. I am pleased with his over clinical presentation today. Continue activities as tolerated, OTC medication as needed. Follow up if symptoms worsen or fail to improve.      Scribe Attestation    I,:  Enedina Craft am acting as a scribe while in the presence of the attending physician.:       I,:  Dewitt Sever personally performed the services described in this documentation    as scribed in my presence.:

## 2024-01-06 ENCOUNTER — OFFICE VISIT (OUTPATIENT)
Dept: URGENT CARE | Facility: CLINIC | Age: 79
End: 2024-01-06
Payer: MEDICARE

## 2024-01-06 VITALS
OXYGEN SATURATION: 97 % | DIASTOLIC BLOOD PRESSURE: 88 MMHG | RESPIRATION RATE: 18 BRPM | HEART RATE: 61 BPM | SYSTOLIC BLOOD PRESSURE: 160 MMHG | TEMPERATURE: 97.9 F

## 2024-01-06 DIAGNOSIS — B02.9 HERPES ZOSTER WITHOUT COMPLICATION: Primary | ICD-10-CM

## 2024-01-06 PROCEDURE — G0463 HOSPITAL OUTPT CLINIC VISIT: HCPCS

## 2024-01-06 PROCEDURE — 99213 OFFICE O/P EST LOW 20 MIN: CPT

## 2024-01-06 RX ORDER — VALACYCLOVIR HYDROCHLORIDE 1 G/1
1000 TABLET, FILM COATED ORAL 3 TIMES DAILY
Qty: 21 TABLET | Refills: 0 | Status: SHIPPED | OUTPATIENT
Start: 2024-01-06 | End: 2024-01-13

## 2024-01-06 NOTE — PROGRESS NOTES
[facility]        NAME: Travon Rosas is a 78 y.o. male  : 1945    MRN: [id number]  DATE: 2024  TIME: 3:27 PM    Assessment and Plan   Herpes zoster without complication [B02.9]  1. Herpes zoster without complication  valACYclovir (VALTREX) 1,000 mg tablet            Patient Instructions     Take Valacylovir as prescribed.   Apply over the counter lidocaine ointment to rash 2-3 times per day as needed for pain.    Keep the rash clean and dry, and avoid topical antibiotics or dressings with adhesive that may cause irritation and delay healing of the rash.    Keep affected area covered to avoid transmission if oozing.  Avoid wearing clothing made from irritating fibers such as wool.    Tylenol/motrin as needed.   Cool compresses over area.    Follow up with PCP if symptoms worsen or do not improve. Speak with PCP about varicella zoster vaccine.  Proceed to the ER with worsening symptoms.       Chief Complaint     Chief Complaint   Patient presents with    Rash     Rash to hand started Tuesday. More areas became red on left hand. Painful. Mild itching. Patient reports pain in left hand radiates up left arm. 8/10 pain on and off for the last week.          History of Present Illness       The patient presents today with complaints of a rash to the palm of his L hand, dorsal aspect of his L hand that started on Tue. Denies fever/chills, erythema, drainage, or any other symptoms. States the area is slightly itchy. Last night he was having a nerve like pain that was radiating up his arm. Denies rash anywhere else on his body.         Review of Systems   Review of Systems   Constitutional:  Negative for chills and fever.   HENT:  Negative for congestion.    Respiratory:  Negative for cough.    Gastrointestinal:  Negative for abdominal pain, diarrhea, nausea and vomiting.   Genitourinary:  Negative for difficulty urinating.   Musculoskeletal:  Positive for myalgias (L arm). Negative for  arthralgias.   Skin:  Positive for rash (L hand).         Current Medications       Current Outpatient Medications:     valACYclovir (VALTREX) 1,000 mg tablet, Take 1 tablet (1,000 mg total) by mouth 3 (three) times a day for 7 days, Disp: 21 tablet, Rfl: 0    aspirin (ECOTRIN) 325 mg EC tablet, Take 1 tablet (325 mg total) by mouth daily (Patient not taking: Reported on 4/8/2022), Disp: 30 tablet, Rfl: 0    ciclopirox (PENLAC) 8 % solution, Apply topically daily at bedtime (Patient not taking: Reported on 4/28/2023), Disp: 6.6 mL, Rfl: 2    naproxen (Naprosyn) 500 mg tablet, Take 1 tablet (500 mg total) by mouth 2 (two) times a day with meals As needed for pain (Patient not taking: Reported on 10/19/2022), Disp: 20 tablet, Rfl: 0    urea (CARMOL) 40 %, Apply topically daily (Patient not taking: Reported on 4/28/2023), Disp: 85 g, Rfl: 2    Current Allergies     Allergies as of 01/06/2024    (No Known Allergies)            The following portions of the patient's history were reviewed and updated as appropriate: allergies, current medications, past family history, past medical history, past social history, past surgical history and problem list.     Past Medical History:   Diagnosis Date    Anxiety     Enlarged prostate        Past Surgical History:   Procedure Laterality Date    HERNIA REPAIR         History reviewed. No pertinent family history.      Medications have been verified.        Objective   /88   Pulse 61   Temp 97.9 °F (36.6 °C)   Resp 18   SpO2 97%        Physical Exam     Physical Exam  Vitals and nursing note reviewed.   Constitutional:       General: He is not in acute distress.     Appearance: Normal appearance. He is not ill-appearing.   HENT:      Head: Normocephalic and atraumatic.      Right Ear: External ear normal.      Left Ear: External ear normal.      Nose: Nose normal.      Mouth/Throat:      Lips: Pink.      Mouth: Mucous membranes are moist.   Eyes:      General: Vision  grossly intact.      Extraocular Movements: Extraocular movements intact.      Pupils: Pupils are equal, round, and reactive to light.   Cardiovascular:      Rate and Rhythm: Normal rate.   Pulmonary:      Effort: Pulmonary effort is normal.   Musculoskeletal:         General: Normal range of motion.      Cervical back: Normal range of motion.   Skin:     General: Skin is warm.      Findings: Rash present.      Comments: Linear raised rash to the L palmar aspect of his hand. Small maculopapular cluster area to the dorsal aspect of his L hand consistent with shingles. No erythema, warmth, drainage noted.    Neurological:      Mental Status: He is alert and oriented to person, place, and time.      Motor: Motor function is intact.      Gait: Gait is intact.   Psychiatric:         Attention and Perception: Attention normal.         Mood and Affect: Mood normal.

## 2024-01-06 NOTE — PATIENT INSTRUCTIONS
Take Valacylovir as prescribed.   Apply over the counter lidocaine ointment to rash 2-3 times per day as needed for pain.    Keep the rash clean and dry, and avoid topical antibiotics or dressings with adhesive that may cause irritation and delay healing of the rash.    Keep affected area covered to avoid transmission if oozing.  Avoid wearing clothing made from irritating fibers such as wool.    Tylenol/motrin as needed.   Cool compresses over area.    Follow up with PCP if symptoms worsen or do not improve. Speak with PCP about varicella zoster vaccine.  Proceed to the ER with worsening symptoms.         Shingles   AMBULATORY CARE:   Shingles  is a painful rash. Shingles is caused by the same virus that causes chickenpox (varicella-zoster). After you get chickenpox, the virus stays in your body for several years without causing any symptoms. Shingles occurs when the virus becomes active again. The active virus travels along a nerve to your skin and causes a rash.  Common signs and symptoms include the following:  Shingles often starts with pain in the back, chest, neck, or face. A rash then develops in the same area. The rash is usually found on only one side of the body. The rash may feel itchy or painful. It starts as red dots that become blisters filled with fluid. The blisters usually grow bigger, become filled with pus, and then crust over after a few days. You may also have any of the following:  Fatigue and muscle weakness    Pain when your skin is lightly touched    Headache    Fever    Eye pain when exposed to light       Call your local emergency number (911 in the [place]) if:   You have trouble moving your arms, legs, or face.    You become confused, or have difficulty speaking.    You have a seizure.    Seek care immediately if:   You have weakness in an arm or leg.    You have dizziness, a severe headache, or hearing or vision loss.    You have painful, red, warm skin around the blisters, or the blisters  drain pus.    Your neck is stiff or you have trouble moving it.    Call your doctor if:   You feel weak or have a headache.    You have a cough, chills, or a fever.    You have abdominal pain or nausea, or you are vomiting.    Your rash becomes more itchy or painful.    Your rash spreads to other parts of your body.    Your pain worsens and does not go away even after you take medicine.    You have questions or concerns about your condition or care.    Medicines:  You may need any of the following:  Antiviral medicine  helps decrease symptoms and healing time. They may also decrease your risk of developing nerve pain. You will need to start taking them within 3 days of the start of symptoms to prevent nerve pain.    Pain medicine  may be prescribed or suggested by your healthcare provider. You may need NSAIDs, acetaminophen, or opioid medicine depending on how much pain you are in. Do not wait until the pain is severe before you take more pain medicine.    Topical anesthetics  are used to numb the skin and decrease pain. They can be a cream, gel, spray, or patch.    Anticonvulsants  decrease nerve pain and may help you sleep at night.    Antidepressants  may be used to decrease nerve pain.    Self-care:  Keep your rash clean and dry. Cover your rash with a bandage or clothing. Do not use bandages that stick to your skin. The sticky part may irritate your skin and make your rash last longer.  Prevent the spread of shingles:       Wash your hands often.  Wash your hands several times each day. Wash after you use the bathroom, change a child's diaper, and before you prepare or eat food. Use soap and water every time. Rub your soapy hands together, lacing your fingers. Wash the front and back of your hands, and in between your fingers. Use the fingers of one hand to scrub under the fingernails of the other hand. Wash for at least 20 seconds. Rinse with warm, running water for several seconds. Then dry your hands with a  clean towel or paper towel. Use hand  that contains alcohol if soap and water are not available. Do not touch your eyes, nose, or mouth without washing your hands first.         Cover a sneeze or cough.  Use a tissue that covers your mouth and nose. Throw the tissue away in a trash can right away. Use the bend of your arm if a tissue is not available. Wash your hands well with soap and water or use a hand .    Stay away from others while you are sick.  Avoid crowds as much as possible.    Ask about vaccines you may need.  Talk to your healthcare provider about your vaccine history. He or she will tell you which vaccines you need, and when to get them.    Prevent shingles or another shingles outbreak:  A vaccine may be given to help prevent shingles. You can get the vaccine even if you already had shingles. The vaccine can help prevent a future outbreak. If you do get shingles again, the vaccine can keep it from becoming severe. The vaccine comes in 2 forms. Your healthcare provider will tell you which form is right for you. The decision is based on your age and any medical conditions you have. A 2-dose vaccine is usually given to adults 50 years or older. A 1-dose vaccine may be given to adults 60 years or older.  Follow up with your doctor as directed:  Write down your questions so you remember to ask them during your visits.  For more information:   Centers for Disease Control and Prevention  [address] [place] [address]  Phone: 5- 690 - [phone]  Phone: 5- 213 - 0367418  Web Address: http://www.cdc.gov    © Copyright [employer] 2022 Information is for End User's use only and may not be sold, redistributed or otherwise used for commercial purposes. All illustrations and images included in CareNotes® are the copyrighted property of [employer] or [employer]  The above information is an  only. It is not intended as medical advice for individual conditions or  treatments. Talk to your doctor, nurse or pharmacist before following any medical regimen to see if it is safe and effective for you.